# Patient Record
Sex: FEMALE | Race: WHITE | NOT HISPANIC OR LATINO | Employment: FULL TIME | URBAN - NONMETROPOLITAN AREA
[De-identification: names, ages, dates, MRNs, and addresses within clinical notes are randomized per-mention and may not be internally consistent; named-entity substitution may affect disease eponyms.]

---

## 2017-10-19 ENCOUNTER — OFFICE VISIT (OUTPATIENT)
Dept: FAMILY MEDICINE CLINIC | Facility: CLINIC | Age: 42
End: 2017-10-19

## 2017-10-19 VITALS
HEIGHT: 63 IN | WEIGHT: 185 LBS | SYSTOLIC BLOOD PRESSURE: 130 MMHG | DIASTOLIC BLOOD PRESSURE: 100 MMHG | BODY MASS INDEX: 32.78 KG/M2

## 2017-10-19 DIAGNOSIS — M79.2 NERVE PAIN: ICD-10-CM

## 2017-10-19 DIAGNOSIS — M25.422 SWELLING OF LEFT ELBOW: ICD-10-CM

## 2017-10-19 DIAGNOSIS — R20.0 NUMBNESS AND TINGLING OF RIGHT LEG: ICD-10-CM

## 2017-10-19 DIAGNOSIS — R32 URINARY INCONTINENCE, UNSPECIFIED TYPE: ICD-10-CM

## 2017-10-19 DIAGNOSIS — M79.604 LUMBAR PAIN WITH RADIATION DOWN RIGHT LEG: Primary | ICD-10-CM

## 2017-10-19 DIAGNOSIS — R20.2 NUMBNESS AND TINGLING OF RIGHT LEG: ICD-10-CM

## 2017-10-19 DIAGNOSIS — M54.50 LUMBAR PAIN WITH RADIATION DOWN RIGHT LEG: Primary | ICD-10-CM

## 2017-10-19 PROCEDURE — 99214 OFFICE O/P EST MOD 30 MIN: CPT | Performed by: NURSE PRACTITIONER

## 2017-10-19 RX ORDER — IBUPROFEN 800 MG/1
800 TABLET ORAL EVERY 6 HOURS PRN
Qty: 90 TABLET | Refills: 5 | OUTPATIENT
Start: 2017-10-19 | End: 2018-01-27

## 2017-10-19 RX ORDER — BACLOFEN 10 MG/1
10 TABLET ORAL 3 TIMES DAILY
Qty: 90 TABLET | Refills: 5 | OUTPATIENT
Start: 2017-10-19 | End: 2019-10-14

## 2017-10-19 NOTE — PROGRESS NOTES
Chief Complaint   Patient presents with   • Back Pain   • Arm Problem     left arm swollen area     Subjective   Karen Estrada is a 42 y.o. female.     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 The current episode started more than 1 month ago. The problem occurs constantly. The problem has been rapidly worsening since onset. The pain is present in the lumbar spine and sacro-iliac. The quality of the pain is described as burning, cramping, shooting and stabbing. The pain radiates to the right foot, right knee and right thigh. The pain is at a severity of 10/10. The pain is severe. The pain is the same all the time. The symptoms are aggravated by lying down, position, sitting, standing, twisting and bending. Stiffness is present all day. Associated symptoms include bladder incontinence, leg pain, numbness, paresis, paresthesias, pelvic pain, tingling and weakness. Pertinent negatives include no abdominal pain, bowel incontinence, chest pain, dysuria, fever, headaches, perianal numbness or weight loss. She has tried analgesics, walking, NSAIDs, heat, home exercises, chiropractic manipulation, ice, muscle relaxant and bed rest (symptoms are worsening with treatment ) for the symptoms. The treatment provided mild relief.   Arm Problem   This is a new (noticed swelling left elbow 2 months ago-now hurting around the area-) problem. The current episode started more than 1 month ago. The problem occurs daily. The problem has been gradually worsening. Associated symptoms include arthralgias, joint swelling, myalgias, neck pain, numbness and weakness.  Pertinent negatives include no abdominal pain, chest pain, chills, congestion, fever, headaches, rash, urinary symptoms, visual change or vomiting. Nothing aggravates the symptoms. She has tried rest, NSAIDs and ice for the symptoms. The treatment provided no relief.        The following portions of the patient's history were reviewed and updated as appropriate: allergies, current medications, past social history and problem list.    Review of Systems   Constitutional: Negative for activity change, appetite change, chills, fever and weight loss.   HENT: Negative.  Negative for congestion.    Eyes: Negative.    Respiratory: Negative.  Negative for apnea and chest tightness.    Cardiovascular: Negative.  Negative for chest pain, palpitations and leg swelling.   Gastrointestinal: Negative.  Negative for abdominal distention, abdominal pain, anal bleeding, bowel incontinence and vomiting.   Endocrine: Negative.  Negative for cold intolerance, heat intolerance, polydipsia, polyphagia and polyuria.   Genitourinary: Positive for bladder incontinence, difficulty urinating, enuresis, frequency and pelvic pain. Negative for decreased urine volume, dyspareunia, dysuria, flank pain, genital sores, hematuria, menstrual problem, urgency, vaginal bleeding, vaginal discharge and vaginal pain.   Musculoskeletal: Positive for arthralgias, back pain, gait problem, joint swelling, myalgias, neck pain and neck stiffness.   Skin: Negative.  Negative for color change and rash.   Allergic/Immunologic: Negative.  Negative for environmental allergies, food allergies and immunocompromised state.   Neurological: Positive for tingling, weakness, numbness and paresthesias. Negative for dizziness, tremors, seizures, syncope, facial asymmetry, speech difficulty, light-headedness and headaches.        Numbness right lateral leg-pain radiating with mid pelvic now having issues with bladder incontinence    Hematological: Negative.  Negative for  "adenopathy. Does not bruise/bleed easily.   Psychiatric/Behavioral: Negative.  Negative for agitation, behavioral problems and confusion.   All other systems reviewed and are negative.      Objective   /100  Ht 63\" (160 cm)  Wt 185 lb (83.9 kg)  BMI 32.77 kg/m2  Physical Exam   Constitutional: She is oriented to person, place, and time. She appears well-developed and well-nourished.   HENT:   Head: Normocephalic and atraumatic.   Eyes: EOM are normal. Pupils are equal, round, and reactive to light. Right eye exhibits no discharge. Left eye exhibits no discharge. No scleral icterus.   Neck: Normal range of motion. Neck supple. No JVD present. No tracheal deviation present. No thyromegaly present.   Cardiovascular: Normal rate, regular rhythm, normal heart sounds and normal pulses.  Exam reveals no gallop and no friction rub.    No murmur heard.  Pulmonary/Chest: Effort normal and breath sounds normal. No stridor. No respiratory distress. She has no wheezes. She has no rales. She exhibits no tenderness.   Abdominal: Soft. Bowel sounds are normal. She exhibits no distension and no mass. There is no tenderness. There is no rebound and no guarding. No hernia.   Genitourinary: Rectum normal and vagina normal. Pelvic exam was performed with patient supine. Uterus is not deviated, not enlarged, not fixed and not tender. Cervix exhibits no motion tenderness, no discharge and no friability. Right adnexum displays no mass. Left adnexum displays no mass.   Musculoskeletal: Normal range of motion. She exhibits tenderness. She exhibits no edema or deformity.        Left elbow: She exhibits swelling. Tenderness found.        Lumbar back: She exhibits tenderness, bony tenderness, pain and spasm. She exhibits no swelling, no edema, no deformity, no laceration and normal pulse.        Back:         Arms:  Lymphadenopathy:     She has no cervical adenopathy.   Neurological: She is alert and oriented to person, place, and " time. She has normal reflexes. She displays normal reflexes. No cranial nerve deficit. She exhibits normal muscle tone. Coordination normal.   Skin: Skin is warm and dry. No rash noted. No erythema. No pallor.   Psychiatric: She has a normal mood and affect.   Nursing note and vitals reviewed.    XR Spine Lumbar 2 or 3 View   Status:  Final result   Visible to patient:  No (Not Released) Dx:  Lumbar pain with radiation down right... Order: [id number]         Details        Reading Physician Reading Date Result Priority       Michael Ch MD 10/19/2017            Narrative               PROCEDURE: Lumbar spine 3 views    REASON FOR EXAM: lumbar pain, M54.5 Low back pain M79.2 Neuralgia  and neuritis, unspecified    FINDINGS: Comparison exam dated April 25, 2011. Lumbar spine  vertebral body heights and alignment are normal. There is no  evidence of fracture or dislocation.Moderate loss of L4-L5  intervertebral disc space height since prior studies suggest  moderate degenerative disc disease at this level. Otherwise  intervertebral disc spaces are intact.             Impression             1. Moderate L4-5 degenerative disc disease..  2. Otherwise no acute lumbar spine abnormality..    Electronically signed by:  Pranav Ch MD  10/19/2017 11:56 AM CDT  Workstation: [id number]              Assessment/Plan   Problem List Items Addressed This Visit        Nervous and Auditory    Lumbar pain with radiation down right leg - Primary    Relevant Orders    XR Spine Lumbar 2 or 3 View (Completed)    MRI Lumbar Spine Without Contrast    Nerve pain    Relevant Orders    XR Spine Lumbar 2 or 3 View (Completed)    Numbness and tingling of right leg       Other    Swelling of left elbow    Relevant Orders    XR elbow 3+ vw left (Completed)    Ambulatory Referral to Orthopedic Surgery    Urinary incontinence           New Medications Ordered This Visit   Medications   • ibuprofen (ADVIL,MOTRIN) 800 MG tablet     Sig: Take 1 tablet  by mouth Every 6 (Six) Hours As Needed for Moderate Pain .     Dispense:  90 tablet     Refill:  5   • baclofen (LIORESAL) 10 MG tablet     Sig: Take 1 tablet by mouth 3 (Three) Times a Day.     Dispense:  90 tablet     Refill:  5      hold home therapy for now -no chiropractor for now-meds as directed, ice to area, return if worsen -ibuprofen and baclofen as needed for pain until MRI

## 2017-10-30 ENCOUNTER — HOSPITAL ENCOUNTER (OUTPATIENT)
Dept: MRI IMAGING | Facility: HOSPITAL | Age: 42
Discharge: HOME | End: 2017-10-30
Admitting: NURSE PRACTITIONER

## 2017-10-30 DIAGNOSIS — M51.36 L4-L5 DISC BULGE: Primary | ICD-10-CM

## 2017-10-30 PROCEDURE — 72148 MRI LUMBAR SPINE W/O DYE: CPT

## 2017-11-20 ENCOUNTER — TELEPHONE (OUTPATIENT)
Dept: FAMILY MEDICINE CLINIC | Facility: CLINIC | Age: 42
End: 2017-11-20

## 2017-12-04 ENCOUNTER — TELEPHONE (OUTPATIENT)
Dept: FAMILY MEDICINE CLINIC | Facility: CLINIC | Age: 42
End: 2017-12-04

## 2017-12-04 RX ORDER — ESCITALOPRAM OXALATE 5 MG/1
5 TABLET ORAL DAILY
Qty: 30 TABLET | Refills: 11 | Status: SHIPPED | OUTPATIENT
Start: 2017-12-04 | End: 2018-06-11

## 2017-12-04 RX ORDER — LOSARTAN POTASSIUM AND HYDROCHLOROTHIAZIDE 12.5; 5 MG/1; MG/1
1 TABLET ORAL DAILY
Qty: 30 TABLET | Refills: 11 | Status: SHIPPED | OUTPATIENT
Start: 2017-12-04 | End: 2017-12-27 | Stop reason: SDUPTHER

## 2017-12-04 NOTE — TELEPHONE ENCOUNTER
PATIENT NEEDS REFILL ON HER escitalopram (LEXAPRO) 5 MG tablet AND HER losartan-hydrochlorothiazide (HYZAAR) 50-12.5 MG per tablet. [facility]

## 2017-12-11 RX ORDER — ESCITALOPRAM OXALATE 5 MG/1
TABLET ORAL
Qty: 30 TABLET | Refills: 5 | Status: SHIPPED | OUTPATIENT
Start: 2017-12-11 | End: 2018-06-11 | Stop reason: SDUPTHER

## 2017-12-27 RX ORDER — LOSARTAN POTASSIUM AND HYDROCHLOROTHIAZIDE 12.5; 5 MG/1; MG/1
1 TABLET ORAL DAILY
Qty: 30 TABLET | Refills: 11 | Status: SHIPPED | OUTPATIENT
Start: 2017-12-27 | End: 2018-11-12 | Stop reason: SDUPTHER

## 2018-01-18 ENCOUNTER — HOSPITAL ENCOUNTER (EMERGENCY)
Facility: HOSPITAL | Age: 43
Discharge: HOME | End: 2018-01-18
Attending: EMERGENCY MEDICINE | Admitting: EMERGENCY MEDICINE

## 2018-01-18 ENCOUNTER — APPOINTMENT (OUTPATIENT)
Dept: CT IMAGING | Facility: HOSPITAL | Age: 43
End: 2018-01-18

## 2018-01-18 ENCOUNTER — APPOINTMENT (OUTPATIENT)
Dept: GENERAL RADIOLOGY | Facility: HOSPITAL | Age: 43
End: 2018-01-18

## 2018-01-18 VITALS
SYSTOLIC BLOOD PRESSURE: 135 MMHG | TEMPERATURE: 97.8 F | WEIGHT: 180 LBS | HEIGHT: 63 IN | HEART RATE: 62 BPM | DIASTOLIC BLOOD PRESSURE: 81 MMHG | RESPIRATION RATE: 18 BRPM | BODY MASS INDEX: 31.89 KG/M2 | OXYGEN SATURATION: 98 %

## 2018-01-18 DIAGNOSIS — J01.20 ACUTE ETHMOIDAL SINUSITIS, RECURRENCE NOT SPECIFIED: ICD-10-CM
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
## 2018-01-18 DIAGNOSIS — S83.412A SPRAIN OF MEDIAL COLLATERAL LIGAMENT OF LEFT KNEE, INITIAL ENCOUNTER: Primary | ICD-10-CM

## 2018-01-18 LAB
ALBUMIN SERPL-MCNC: 4.4 G/DL (ref 3.4–4.8)
ALBUMIN/GLOB SERPL: 1.4 G/DL (ref 1.1–1.8)
ALP SERPL-CCNC: 67 U/L (ref 38–126)
ALT SERPL W P-5'-P-CCNC: 89 U/L (ref 9–52)
ANION GAP SERPL CALCULATED.3IONS-SCNC: 12 MMOL/L (ref 5–15)
AST SERPL-CCNC: 68 U/L (ref 14–36)
BASOPHILS # BLD AUTO: 0.02 10*3/MM3 (ref 0–0.2)
BASOPHILS NFR BLD AUTO: 0.3 % (ref 0–2)
BILIRUB SERPL-MCNC: 0.3 MG/DL (ref 0.2–1.3)
BILIRUB UR QL STRIP: NEGATIVE
BUN BLD-MCNC: 13 MG/DL (ref 7–21)
BUN/CREAT SERPL: 21.7 (ref 7–25)
CALCIUM SPEC-SCNC: 9.5 MG/DL (ref 8.4–10.2)
CHLORIDE SERPL-SCNC: 102 MMOL/L (ref 95–110)
CLARITY UR: CLEAR
CO2 SERPL-SCNC: 27 MMOL/L (ref 22–31)
COLOR UR: YELLOW
CREAT BLD-MCNC: 0.6 MG/DL (ref 0.5–1)
DEPRECATED RDW RBC AUTO: 41.4 FL (ref 36.4–46.3)
EOSINOPHIL # BLD AUTO: 0.14 10*3/MM3 (ref 0–0.7)
EOSINOPHIL NFR BLD AUTO: 1.9 % (ref 0–7)
ERYTHROCYTE [DISTWIDTH] IN BLOOD BY AUTOMATED COUNT: 13.2 % (ref 11.5–14.5)
GFR SERPL CREATININE-BSD FRML MDRD: 109 ML/MIN/1.73 (ref 60–135)
GLOBULIN UR ELPH-MCNC: 3.1 GM/DL (ref 2.3–3.5)
GLUCOSE BLD-MCNC: 99 MG/DL (ref 60–100)
GLUCOSE UR STRIP-MCNC: NEGATIVE MG/DL
HCG SERPL QL: NEGATIVE
HCT VFR BLD AUTO: 37.1 % (ref 35–45)
HGB BLD-MCNC: 12.8 G/DL (ref 12–15.5)
HGB UR QL STRIP.AUTO: NEGATIVE
IMM GRANULOCYTES # BLD: 0.03 10*3/MM3 (ref 0–0.02)
IMM GRANULOCYTES NFR BLD: 0.4 % (ref 0–0.5)
KETONES UR QL STRIP: NEGATIVE
LEUKOCYTE ESTERASE UR QL STRIP.AUTO: NEGATIVE
LIPASE SERPL-CCNC: 76 U/L (ref 23–300)
LYMPHOCYTES # BLD AUTO: 1.83 10*3/MM3 (ref 0.6–4.2)
LYMPHOCYTES NFR BLD AUTO: 25.5 % (ref 10–50)
MCH RBC QN AUTO: 29.3 PG (ref 26.5–34)
MCHC RBC AUTO-ENTMCNC: 34.5 G/DL (ref 31.4–36)
MCV RBC AUTO: 84.9 FL (ref 80–98)
MONOCYTES # BLD AUTO: 0.77 10*3/MM3 (ref 0–0.9)
MONOCYTES NFR BLD AUTO: 10.7 % (ref 0–12)
NEUTROPHILS # BLD AUTO: 4.4 10*3/MM3 (ref 2–8.6)
NEUTROPHILS NFR BLD AUTO: 61.2 % (ref 37–80)
NITRITE UR QL STRIP: NEGATIVE
PH UR STRIP.AUTO: 7.5 [PH] (ref 5–9)
PLATELET # BLD AUTO: 312 10*3/MM3 (ref 150–450)
PMV BLD AUTO: 9.2 FL (ref 8–12)
POTASSIUM BLD-SCNC: 3.5 MMOL/L (ref 3.5–5.1)
PROT SERPL-MCNC: 7.5 G/DL (ref 6.3–8.6)
PROT UR QL STRIP: NEGATIVE
RBC # BLD AUTO: 4.37 10*6/MM3 (ref 3.77–5.16)
SODIUM BLD-SCNC: 141 MMOL/L (ref 137–145)
SP GR UR STRIP: 1.01 (ref 1–1.03)
UROBILINOGEN UR QL STRIP: NORMAL
WBC NRBC COR # BLD: 7.19 10*3/MM3 (ref 3.2–9.8)

## 2018-01-18 PROCEDURE — 70450 CT HEAD/BRAIN W/O DYE: CPT

## 2018-01-18 PROCEDURE — 99284 EMERGENCY DEPT VISIT MOD MDM: CPT

## 2018-01-18 PROCEDURE — 73630 X-RAY EXAM OF FOOT: CPT

## 2018-01-18 PROCEDURE — 84703 CHORIONIC GONADOTROPIN ASSAY: CPT | Performed by: EMERGENCY MEDICINE

## 2018-01-18 PROCEDURE — 85025 COMPLETE CBC W/AUTO DIFF WBC: CPT | Performed by: EMERGENCY MEDICINE

## 2018-01-18 PROCEDURE — 71045 X-RAY EXAM CHEST 1 VIEW: CPT

## 2018-01-18 PROCEDURE — 80053 COMPREHEN METABOLIC PANEL: CPT | Performed by: EMERGENCY MEDICINE

## 2018-01-18 PROCEDURE — 0 IOPAMIDOL 61 % SOLUTION: Performed by: EMERGENCY MEDICINE

## 2018-01-18 PROCEDURE — 81003 URINALYSIS AUTO W/O SCOPE: CPT | Performed by: EMERGENCY MEDICINE

## 2018-01-18 PROCEDURE — 73564 X-RAY EXAM KNEE 4 OR MORE: CPT

## 2018-01-18 PROCEDURE — 74177 CT ABD & PELVIS W/CONTRAST: CPT

## 2018-01-18 PROCEDURE — 83690 ASSAY OF LIPASE: CPT | Performed by: EMERGENCY MEDICINE

## 2018-01-18 RX ORDER — AMOXICILLIN AND CLAVULANATE POTASSIUM 875; 125 MG/1; MG/1
1 TABLET, FILM COATED ORAL 2 TIMES DAILY
Qty: 20 TABLET | Refills: 0 | Status: SHIPPED | OUTPATIENT
Start: 2018-01-18 | End: 2018-01-28

## 2018-01-18 RX ORDER — CETIRIZINE HYDROCHLORIDE, PSEUDOEPHEDRINE HYDROCHLORIDE 5; 120 MG/1; MG/1
1 TABLET, FILM COATED, EXTENDED RELEASE ORAL 2 TIMES DAILY PRN
Qty: 14 TABLET | Refills: 0 | OUTPATIENT
Start: 2018-01-18 | End: 2019-08-11

## 2018-01-18 RX ORDER — SODIUM CHLORIDE 0.9 % (FLUSH) 0.9 %
10 SYRINGE (ML) INJECTION AS NEEDED
Status: DISCONTINUED | OUTPATIENT
Start: 2018-01-18 | End: 2018-01-18 | Stop reason: HOSPADM

## 2018-01-18 RX ADMIN — IOPAMIDOL 90 ML: 612 INJECTION, SOLUTION INTRAVENOUS at 10:04

## 2018-01-18 NOTE — ED PROVIDER NOTES
Subjective   HPI Comments: 41yo female pmh significant htn/anxiety presents ED via EMS s/p trip et fall down stairs c/o headache/left foot pain/left knee pain.  Pt daughter reports patient called for help and subsequently found patient sitting at bottom of stairs with rapid breathing/shaking of arms et legs with eyes open staring at floor and subsequent unresponsive.  Per RN notes, EMS reported similar behavior which resolved with verbal reassurance.  Pt denies neck pain/back pain/chest pain/soa/abd pain/hx seizure.  No reported hx postictal confusion or tongue biting noted.    Patient is a 42 y.o. female presenting with fall.   Fall   Mechanism of injury: fall    Injury location:  Leg  Leg injury location:  L foot and L knee  Incident location:  Home  Arrived directly from scene: yes    Fall:     Fall occurred:  Down stairs    Entrapped after fall: no    Suspicion of alcohol use: no    Suspicion of drug use: no    Associated symptoms: headaches    Associated symptoms: no back pain and no neck pain        Review of Systems   Constitutional: Negative.    Eyes: Negative.    Respiratory: Negative.    Cardiovascular: Negative.    Gastrointestinal: Negative.    Genitourinary: Negative.    Musculoskeletal: Positive for arthralgias. Negative for back pain and neck pain.   Neurological: Positive for headaches.       Past Medical History:   Diagnosis Date   • Backache     radiation to legs-abnormal MRI   • Benign hypertension    • Breast lump     left breast   • Chest pain    • Cough    • Degenerative joint disease involving multiple joints    • Epigastric pain    • Essential hypertension    • Fatigue    • Functional heart murmur    • Gestational diabetes mellitus    • Hypertensive disorder    • Intrinsic asthma with status asthmaticus    • Low back pain    • Lumbar disc lesion    • Lymphadenopathy    • Malaise and fatigue    • Migraine    • Neck pain     throat pain   • Non-IgE mediated allergic asthma    • Noncompliance  with treatment    • Overweight    • Plantar fascial fibromatosis    • Plantar fasciitis of right foot    • Soft tissue swelling     right lateral chest wall   • Surgery follow-up    • Upper respiratory infection    • Wheezing        Allergies   Allergen Reactions   • Promethazine Hallucinations   • Adhesive Tape Swelling and Rash       Past Surgical History:   Procedure Laterality Date   • BREAST BIOPSY  1998    left mass, fibrosis, mild   •  SECTION     • COLONOSCOPY  2005    normal colonoscopic exam.  Irritable bowel   • DILATATION AND CURETTAGE  1998    incomplete    • FOOT SURGERY  2015    partial plantar fasciectomy, right foot   • HYSTERECTOMY  2007    menorrhagia   • INJECTION OF MEDICATION  2014    dexamethasone (1)   • INJECTION OF MEDICATION  2014    inj Tend-Sheath Ligament  (3)   • INJECTION OF MEDICATION  2014    Kenalog (4)   • INJECTION OF MEDICATION  2011    Toradol (1)   • PAP SMEAR  10/15/1996    normal   • TUBAL ABDOMINAL LIGATION     • VAGINAL DELIVERY      x3       History reviewed. No pertinent family history.    Social History     Social History   • Marital status:      Spouse name: N/A   • Number of children: N/A   • Years of education: N/A     Social History Main Topics   • Smoking status: Never Smoker   • Smokeless tobacco: Never Used   • Alcohol use No   • Drug use: No   • Sexual activity: Not Asked     Other Topics Concern   • None     Social History Narrative   • None           Objective   Physical Exam   Constitutional: She is oriented to person, place, and time. She appears well-developed and well-nourished.   HENT:   Head: Normocephalic and atraumatic. Head is without raccoon's eyes, without Rose's sign, without abrasion and without contusion.   Right Ear: External ear normal.   Left Ear: External ear normal.   Nose: Nose normal.   Mouth/Throat: Oropharynx is clear and moist.   Eyes: EOM are normal.  Pupils are equal, round, and reactive to light.   Neck: Normal range of motion. Neck supple. No JVD present. No tracheal deviation present.   Neg nexus   Cardiovascular: Normal rate, regular rhythm, normal heart sounds and intact distal pulses.  Exam reveals no gallop and no friction rub.    No murmur heard.  Pulmonary/Chest: Effort normal and breath sounds normal. She has no wheezes. She has no rales.   Abdominal: Soft. Bowel sounds are normal. She exhibits no distension and no mass. There is no tenderness. There is no rebound and no guarding.   Musculoskeletal: Normal range of motion.        Left knee: She exhibits normal range of motion, no swelling, no effusion, no ecchymosis, no deformity, no laceration, no erythema, normal alignment, no LCL laxity, no bony tenderness, normal meniscus and no MCL laxity. Tenderness found. MCL tenderness noted.        Left foot: Normal.   nontender c/t/l spine. Neg stepoff/deformity   Lymphadenopathy:     She has no cervical adenopathy.   Neurological: She is alert and oriented to person, place, and time. She has normal strength. No cranial nerve deficit or sensory deficit. Coordination normal. GCS eye subscore is 4. GCS verbal subscore is 5. GCS motor subscore is 6.   Skin: Skin is warm and dry.   Nursing note and vitals reviewed.      Procedures         ED Course  ED Course      Labs Reviewed   COMPREHENSIVE METABOLIC PANEL - Abnormal; Notable for the following:        Result Value    ALT (SGPT) 89 (*)     AST (SGOT) 68 (*)     All other components within normal limits   CBC WITH AUTO DIFFERENTIAL - Abnormal; Notable for the following:     Immature Grans, Absolute 0.03 (*)     All other components within normal limits   LIPASE - Normal   URINALYSIS W/ CULTURE IF INDICATED - Normal    Narrative:     Urine microscopic not indicated.   HCG, SERUM, QUALITATIVE - Normal   CBC AND DIFFERENTIAL    Narrative:     The following orders were created for panel order CBC &  Differential.  Procedure                               Abnormality         Status                     ---------                               -----------         ------                     CBC Auto Differential[[id number]]        Abnormal            Final result                 Please view results for these tests on the individual orders.     Xr Knee 4+ View Left    Result Date: 1/18/2018  Narrative: Four view left knee HISTORY: Pain after falling AP, oblique, tunnel and lateral views obtained. COMPARISON: None No fracture or dislocation. No suprapatellar effusion. No other osseous or articular abnormality.     Impression: CONCLUSION: No fracture or dislocation 30027 Electronically signed by:  Theodore Carmichael MD  1/18/2018 9:20 AM CST Workstation: [email or web address]    Xr Foot 3+ View Left    Result Date: 1/18/2018  Narrative: PROCEDURE: Three views left foot COMPARISON: Left foot MRI dated 3/23/2010 HISTORY:Injury, pain FINDINGS: No acute fracture, subluxation or focal osseous lesion. There is a stable lucency in the base of the first proximal phalanx correlating to a bone cyst seen on prior MRI. There is a stable sclerotic bone island in the head of the fourth metatarsal. No aggressive osseous lesion is seen. There is a far calcaneal spur.     Impression: CONCLUSION:  No radiographic evidence of acute fracture Electronically signed by:  Bucky Jane MD  1/18/2018 9:16 AM CST Workstation: [id number]    Ct Head Without Contrast    Result Date: 1/18/2018  Narrative: [facility] [place] Patient Name: MRS. ILENE CLARK ORDERING: TATYANA LEROY ATTENDING: TATYANA LEROY REFERRING: TATYANA LEROY ----------------------- EXAMINATION:  CT SCAN OF THE HEAD WITHOUT INTRAVENOUS CONTRAST CLINICAL INFORMATION:  Fall/headache DOSE LENGTH PRODUCT: 892.5 This exam was performed using radiation doses that are as low as reasonably achievable (ALARA). This exam was performed according to our departmental dose optimization  program, which includes automated exposure control, adjustment of the mA and/or KV according to patient size and/or use of iterative reconstruction technique. COMPARISON: None available. TECHNIQUE:  Axial images from skull base to vertex.  FINDINGS: There is no evidence of intracranial hemorrhage, parenchymal mass, midline shift, or focal mass effect. There is no hydrocephalus or effacement of the basilar cisterns. There is no extra-axial hemorrhage or collection identified. There is partial opacification of the sphenoid sinuses and ethmoid air cells. The mastoid air cells and visualized paranasal sinuses appear otherwise clear.     Impression: No evidence of intracranial hemorrhage, mass effect or large acute infarct. Sphenoid and ethmoid sinus disease. Electronically signed by:  Bucky Jane MD  1/18/2018 8:49 AM CST Workstation: [id number]    Ct Abdomen Pelvis With Contrast    Result Date: 1/18/2018  Narrative: CT ABDOMEN AND PELVIS WITH CONTRAST HISTORY: Fell. Elevated liver function test. Following the intravenous administration of contrast, axial spiral scans of the abdomen and pelvis were obtained.  Coronal and sagital reconstructions were preformed. This exam was performed according to our departmental dose-optimization program, which includes automated exposure control, adjustment of the mA and/or kV according to patient size and/or use of iterative reconstruction technique. DLP: 408.80 COMPARISON: None Scans of the lung bases demonstrate minimal dependent atelectasis. No acute osseous abnormality. Degenerative changes lower thoracic spine and degenerative disc disease and spondylotic change L4-5. Fatty infiltration of the liver. The gallbladder is unremarkable. The spleen is unremarkable. The pancreas is unremarkable. The adrenal glands are unremarkable. No renal or ureteral calculi. No renal mass. No hydronephrosis. Unremarkable bladder. No pelvic mass. Hysterectomy. No abdominal aortic aneurysm. No  adenopathy. No bowel obstruction. Normal appendix. No free air. No free fluid. Small fat-containing umbilical hernia.     Impression: CONCLUSION: No intra-abdominal or pelvic injury identified. Fatty infiltration of the liver. Small fat-containing umbilical hernia. Hysterectomy. Degenerative disc disease and spondylotic change L4-5. 80200 Electronically signed by:  Theodore Carmichael MD  1/18/2018 10:28 AM CST Workstation: [email or web address] Chest 1 View    Result Date: 1/18/2018  Narrative: [facility] Patient Name: MRS. ILENE CLARK ORDERING: SHADY JIANG ATTENDING: SHADY JIANG REFERRING: SHADY JIANG ----------------------- PROCEDURE: Portable chest x-ray TECHNIQUE: Single AP view of the chest COMPARISON: None HISTORY: Injury, pain FINDINGS:  Life-support devices: None Lungs/pleura: Clear Heart, hilar and mediastinal structures:  Normal accounting for projection and technique     Impression: CONCLUSION: No acute pulmonary disease. Electronically signed by:  Bucky Jane MD  1/18/2018 9:09 AM CST Workstation: [id number]                [facility]    Final diagnoses:   Sprain of medial collateral ligament of left knee, initial encounter   Acute ethmoidal sinusitis, recurrence not specified   Fall, initial encounter            Shady Jiang MD  01/18/18 0919       Shady Jiang MD  01/18/18 1040

## 2018-01-18 NOTE — ED TRIAGE NOTES
Patient presents to the ED post fall this morning via Ambulance. Patient states she tripped over a cat and fell down some stairs. Patient is unsure how many stairs she fell down. Patient is complaining of back pain and left knee/lower leg pain. Per EMS report from [facility], when ambulance arrived patient's daughter stated that the patient became nauseated and then seemed like she was having seizure like activity (her muscles tensed up and she was not talking). [facility]admini with EMS was able to  patient to calm down. When patient was loaded up into ambulance she again began to tense her muscles as though attempting to portray seizure like activity per EMS report. Padmini with EMS told patient in order to fully assess her she needed to relax and let Padmini do her job. Patient then relaxed and was brought to the ED.

## 2018-01-22 ENCOUNTER — OFFICE VISIT (OUTPATIENT)
Dept: ORTHOPEDIC SURGERY | Facility: CLINIC | Age: 43
End: 2018-01-22

## 2018-01-22 VITALS — WEIGHT: 180 LBS | BODY MASS INDEX: 31.89 KG/M2 | HEIGHT: 63 IN
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
## 2018-01-22 DIAGNOSIS — M25.562 ACUTE PAIN OF LEFT KNEE: ICD-10-CM

## 2018-01-22 DIAGNOSIS — S89.92XA LEFT KNEE INJURY, INITIAL ENCOUNTER: Primary | ICD-10-CM

## 2018-01-22 DIAGNOSIS — M23.92 INTERNAL DERANGEMENT OF LEFT KNEE: ICD-10-CM

## 2018-01-22 PROCEDURE — 99214 OFFICE O/P EST MOD 30 MIN: CPT | Performed by: NURSE PRACTITIONER

## 2018-01-22 NOTE — PROGRESS NOTES
Karen Estrada is a 42 y.o. female   Primary provider:  MERARI Esquivel       Chief Complaint   Patient presents with   • Left Knee - Pain     X-rays done @ [facility] 1/18/18    HISTORY OF PRESENT ILLNESS: Patient presents with left knee injury, seen in ED on 1/18/18. Patient states she fell down the stairs at home. Pain scale today 6/10 when standing.     HPI Comments: Patient complains of left knee pain/injury after falling down a flight of stairs at her home on 1/18/2018.  Patient was seen in the ED on that date with x-rays done and placed in a knee immobilizer and given crutches for ambulation.  Patient was prescribed Lodine for pain.  Pain is described as constant and moderate in severity.  Pain is described as aching in nature with associated popping sensations, bruising and swelling.  Pain is worse with weightbearing, standing, walking, sitting and driving.  Patient has increased pain with rotation and pivoting of her left knee.  Pain improves mildly with ice therapy, anti-inflammatory medication, rest and immobilization.    Pain   This is a new problem. The current episode started in the past 7 days (1/18/2018). The problem occurs constantly. Associated symptoms include joint swelling. Associated symptoms comments: Left knee pain . The symptoms are aggravated by standing and walking (sitting and driving). She has tried rest, ice and NSAIDs (crutches, knee immobilizer) for the symptoms. The treatment provided mild relief.        CONCURRENT MEDICAL HISTORY:    Past Medical History:   Diagnosis Date   • Backache     radiation to legs-abnormal MRI   • Benign hypertension    • Breast lump     left breast   • Chest pain    • Cough    • Degenerative joint disease involving multiple joints    • Epigastric pain    • Essential hypertension    • Fatigue    • Functional heart murmur    • Gestational diabetes mellitus    • Hypertensive disorder    • Intrinsic asthma with status asthmaticus    • Low back pain    •  Lumbar disc lesion    • Lymphadenopathy    • Malaise and fatigue    • Migraine    • Neck pain     throat pain   • Non-IgE mediated allergic asthma    • Noncompliance with treatment    • Overweight    • Plantar fascial fibromatosis    • Plantar fasciitis of right foot    • Soft tissue swelling     right lateral chest wall   • Surgery follow-up    • Upper respiratory infection    • Wheezing        Allergies   Allergen Reactions   • Promethazine Hallucinations   • Adhesive Tape Swelling and Rash         Current Outpatient Prescriptions:   •  amoxicillin-clavulanate (AUGMENTIN) 875-125 MG per tablet, Take 1 tablet by mouth 2 (Two) Times a Day for 10 days., Disp: 20 tablet, Rfl: 0  •  baclofen (LIORESAL) 10 MG tablet, Take 1 tablet by mouth 3 (Three) Times a Day., Disp: 90 tablet, Rfl: 5  •  cetirizine-pseudoephedrine (ZyrTEC-D) 5-120 MG per 12 hr tablet, Take 1 tablet by mouth 2 (Two) Times a Day As Needed for Rhinitis., Disp: 14 tablet, Rfl: 0  •  escitalopram (LEXAPRO) 5 MG tablet, Take 1 tablet by mouth Daily., Disp: 30 tablet, Rfl: 11  •  escitalopram (LEXAPRO) 5 MG tablet, TAKE 1 TABLET BY MOUTH DAILY, Disp: 30 tablet, Rfl: 5  •  etodolac (LODINE) 300 MG capsule, Take 1 capsule by mouth 3 (Three) Times a Day As Needed (pain)., Disp: 30 capsule, Rfl: 0  •  ibuprofen (ADVIL,MOTRIN) 800 MG tablet, Take 1 tablet by mouth Every 6 (Six) Hours As Needed for Moderate Pain ., Disp: 90 tablet, Rfl: 5  •  losartan-hydrochlorothiazide (HYZAAR) 50-12.5 MG per tablet, Take 1 tablet by mouth Daily., Disp: 30 tablet, Rfl: 11    Past Surgical History:   Procedure Laterality Date   • BREAST BIOPSY  1998    left mass, fibrosis, mild   •  SECTION     • COLONOSCOPY  2005    normal colonoscopic exam.  Irritable bowel   • DILATATION AND CURETTAGE  1998    incomplete    • FOOT SURGERY  2015    partial plantar fasciectomy, right foot   • HYSTERECTOMY  2007    menorrhagia   • INJECTION OF  "MEDICATION  11/26/2014    dexamethasone (1)   • INJECTION OF MEDICATION  11/26/2014    inj Tend-Sheath Ligament  (3)   • INJECTION OF MEDICATION  11/26/2014    Kenalog (4)   • INJECTION OF MEDICATION  11/16/2011    Toradol (1)   • PAP SMEAR  10/15/1996    normal   • TUBAL ABDOMINAL LIGATION     • VAGINAL DELIVERY      x3       History reviewed. No pertinent family history.    Social History     Social History   • Marital status:      Spouse name: N/A   • Number of children: N/A   • Years of education: N/A     Occupational History   • Not on file.     Social History Main Topics   • Smoking status: Never Smoker   • Smokeless tobacco: Never Used   • Alcohol use No   • Drug use: No   • Sexual activity: Not on file     Other Topics Concern   • Not on file     Social History Narrative        Review of Systems   Constitutional: Negative.    HENT: Negative.    Eyes: Negative.    Respiratory: Negative.    Cardiovascular: Negative.    Gastrointestinal: Negative.    Endocrine: Negative.    Genitourinary: Negative.    Musculoskeletal: Positive for gait problem and joint swelling.        Left knee pain     Skin: Negative.    Allergic/Immunologic: Negative.    Hematological: Negative.    Psychiatric/Behavioral: Positive for sleep disturbance.       PHYSICAL EXAMINATION:       Ht 160 cm (63\")  Wt 81.6 kg (180 lb)  BMI 31.89 kg/m2    Physical Exam   Constitutional: She is oriented to person, place, and time. Vital signs are normal. She appears well-developed and well-nourished.   HENT:   Head: Normocephalic.   Pulmonary/Chest: Effort normal. No respiratory distress.   Abdominal: Soft. She exhibits no distension.   Musculoskeletal:        Right knee: She exhibits no effusion.        Left knee: She exhibits no effusion.   Neurological: She is alert and oriented to person, place, and time. GCS eye subscore is 4. GCS verbal subscore is 5. GCS motor subscore is 6.   Skin: Skin is warm, dry and intact.   Psychiatric: She has a " normal mood and affect. Her speech is normal and behavior is normal. Judgment and thought content normal. Cognition and memory are normal.   Vitals reviewed.      GAIT:     []  Normal  [x]  Antalgic    Assistive device: [x]  None  []  Walker     []  Crutches  []  Cane     []  Wheelchair  []  Stretcher    Right Knee Exam     Tenderness   The patient is experiencing no tenderness.         Range of Motion   The patient has normal right knee ROM.    Muscle Strength     The patient has normal right knee strength.    Tests   Tapan:  Medial - negative Lateral - negative  Varus: negative  Valgus: negative    Other   Erythema: absent  Sensation: normal  Pulse: present  Swelling: none  Other tests: no effusion present      Left Knee Exam     Tenderness   The patient is experiencing tenderness in the medial joint line.    Range of Motion   Extension: 0   Flexion: 100     Muscle Strength     The patient has normal left knee strength.    Tests   Tapan:  Medial - positive Lateral - positive  Varus: negative  Valgus: negative    Other   Erythema: absent  Sensation: normal  Pulse: present  Swelling: mild  Effusion: no effusion present    Comments:  Mild, generalized swelling of the knee and lower leg noted. Skin is intact. No deformity. No ecchymosis.             Xr Knee 4+ View Left    Result Date: 1/18/2018  Narrative: Four view left knee HISTORY: Pain after falling AP, oblique, tunnel and lateral views obtained. COMPARISON: None No fracture or dislocation. No suprapatellar effusion. No other osseous or articular abnormality.     Impression: CONCLUSION: No fracture or dislocation [id number] Electronically signed by:  Theodore Carmichael MD  1/18/2018 9:20 AM CST Workstation: [id number]    Xr Foot 3+ View Left    Result Date: 1/18/2018  Narrative: PROCEDURE: Three views left foot COMPARISON: Left foot MRI dated 3/23/2010 HISTORY:Injury, pain FINDINGS: No acute fracture, subluxation or focal osseous lesion. There is a stable lucency  in the base of the first proximal phalanx correlating to a bone cyst seen on prior MRI. There is a stable sclerotic bone island in the head of the fourth metatarsal. No aggressive osseous lesion is seen. There is a far calcaneal spur.     Impression: CONCLUSION:  No radiographic evidence of acute fracture Electronically signed by:  Bucky Jane MD  1/18/2018 9:16 AM CST Workstation: [id number]    CT Head Without Contrast    Result Date: 1/18/2018  Narrative: [facility] [place] Patient Name: MRS. ILENE CLARK ORDERING: TATYANA LEROY ATTENDING: TATYANA LEROY REFERRING: TATYANA LEROY EXAMINATION:  CT SCAN OF THE HEAD WITHOUT INTRAVENOUS CONTRAST CLINICAL INFORMATION:  Fall/headache DOSE LENGTH PRODUCT: 892.5 This exam was performed using radiation doses that are as low as reasonably achievable (ALARA). This exam was performed according to our departmental dose optimization program, which includes automated exposure control, adjustment of the mA and/or KV according to patient size and/or use of iterative reconstruction technique. COMPARISON: None available. TECHNIQUE:  Axial images from skull base to vertex.  FINDINGS: There is no evidence of intracranial hemorrhage, parenchymal mass, midline shift, or focal mass effect. There is no hydrocephalus or effacement of the basilar cisterns. There is no extra-axial hemorrhage or collection identified. There is partial opacification of the sphenoid sinuses and ethmoid air cells. The mastoid air cells and visualized paranasal sinuses appear otherwise clear.     Impression: No evidence of intracranial hemorrhage, mass effect or large acute infarct. Sphenoid and ethmoid sinus disease. Electronically signed by:  Bucky Jane MD  1/18/2018 8:49 AM CST Workstation: [id number]        ASSESSMENT:    Diagnoses and all orders for this visit:    Left knee injury, initial encounter  -     Miscellaneous DME  -     MRI Knee Left Without Contrast; Future    Fall down stairs,  initial encounter  -     Miscellaneous DME  -     MRI Knee Left Without Contrast; Future    Acute pain of left knee  -     Miscellaneous DME  -     MRI Knee Left Without Contrast; Future    Internal derangement of left knee  -     MRI Knee Left Without Contrast; Future      PLAN    X-rays of left knee done in the ED on 1/18/2018 are reviewed today.  Patient since sustained a significant twisting-type injury when she fell down a flight of stairs.  Patient continues to have pain in her left knee with associated swelling and increased pain with pivoting and rotation.  Symptoms are suspicious for meniscal injury.  Recommend MRI of left knee to evaluate for ligament and/or meniscus injury.  Recommend hinged knee brace for support/stability.  Recommend to continue with modified weight-bearing with use of her crutches.  Discussed with patient that she may gradually progress her weightbearing as her pain improves, based on her pain and as tolerated.  Recommend to continue with intermittent elevation of the left knee to minimize swelling.  Recommend to continue with ice therapy intermittently 3-4 times daily for 20 minutes at a time.  Recommend to continue Lodine for pain, as previously prescribed.  Follow-up after MRI.    Return after MRI.        This document has been electronically signed by MERARI Ulloa on January 22, 2018 12:23 PM    MERARI Ulloa

## 2018-01-26 ENCOUNTER — HOSPITAL ENCOUNTER (OUTPATIENT)
Dept: MRI IMAGING | Facility: HOSPITAL | Age: 43
Discharge: HOME | End: 2018-01-26
Admitting: NURSE PRACTITIONER

## 2018-01-26 DIAGNOSIS — S89.92XA LEFT KNEE INJURY, INITIAL ENCOUNTER: ICD-10-CM

## 2018-01-26 DIAGNOSIS — M23.92 INTERNAL DERANGEMENT OF LEFT KNEE: ICD-10-CM
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
## 2018-01-26 DIAGNOSIS — M25.562 ACUTE PAIN OF LEFT KNEE: ICD-10-CM

## 2018-01-26 PROCEDURE — 73721 MRI JNT OF LWR EXTRE W/O DYE: CPT

## 2018-01-30 ENCOUNTER — OFFICE VISIT (OUTPATIENT)
Dept: ORTHOPEDIC SURGERY | Facility: CLINIC | Age: 43
End: 2018-01-30

## 2018-01-30 VITALS — HEIGHT: 63 IN | WEIGHT: 192 LBS | BODY MASS INDEX: 34.02 KG/M2

## 2018-01-30 DIAGNOSIS — S83.412A SPRAIN OF MEDIAL COLLATERAL LIGAMENT OF LEFT KNEE, INITIAL ENCOUNTER: ICD-10-CM

## 2018-01-30 DIAGNOSIS — M25.562 ACUTE PAIN OF LEFT KNEE: Primary | ICD-10-CM

## 2018-01-30 DIAGNOSIS — M23.92 INTERNAL DERANGEMENT OF LEFT KNEE: ICD-10-CM

## 2018-01-30 DIAGNOSIS — M25.462 KNEE EFFUSION, LEFT: ICD-10-CM

## 2018-01-30 PROCEDURE — 20610 DRAIN/INJ JOINT/BURSA W/O US: CPT | Performed by: NURSE PRACTITIONER

## 2018-01-30 PROCEDURE — 99214 OFFICE O/P EST MOD 30 MIN: CPT | Performed by: NURSE PRACTITIONER

## 2018-01-30 RX ADMIN — TRIAMCINOLONE ACETONIDE 40 MG: 40 INJECTION, SUSPENSION INTRA-ARTICULAR; INTRAMUSCULAR at 10:05

## 2018-01-30 RX ADMIN — LIDOCAINE HYDROCHLORIDE 2 ML: 10 INJECTION, SOLUTION INFILTRATION; PERINEURAL at 10:05

## 2018-01-30 NOTE — PROGRESS NOTES
Karen Estrada is a 42 y.o. female returns for     Chief Complaint   Patient presents with   • Left Knee - Follow-up   • Results     mri left knee       HISTORY OF PRESENT ILLNESS: Patient presents to office for follow-up of left knee pain/injury.  Initial injury occurred on 1/18/2018 when she fell down the stairs at her home.  Left knee pain and swelling have been gradually improving since the time of the injury.  Patient has been wearing a hinged knee brace to the left knee for support.  Patient is no longer requiring crutches for ambulation and has been able to progress her weightbearing.    CONCURRENT MEDICAL HISTORY:    Past Medical History:   Diagnosis Date   • Backache     radiation to legs-abnormal MRI   • Benign hypertension    • Breast lump     left breast   • Chest pain    • Cough    • Degenerative joint disease involving multiple joints    • Epigastric pain    • Essential hypertension    • Fatigue    • Functional heart murmur    • Gestational diabetes mellitus    • Hypertensive disorder    • Intrinsic asthma with status asthmaticus    • Low back pain    • Lumbar disc lesion    • Lymphadenopathy    • Malaise and fatigue    • Migraine    • Neck pain     throat pain   • Non-IgE mediated allergic asthma    • Noncompliance with treatment    • Overweight    • Plantar fascial fibromatosis    • Plantar fasciitis of right foot    • Soft tissue swelling     right lateral chest wall   • Surgery follow-up    • Upper respiratory infection    • Wheezing        Allergies   Allergen Reactions   • Promethazine Hallucinations   • Adhesive Tape Swelling and Rash         Current Outpatient Prescriptions:   •  baclofen (LIORESAL) 10 MG tablet, Take 1 tablet by mouth 3 (Three) Times a Day., Disp: 90 tablet, Rfl: 5  •  cetirizine-pseudoephedrine (ZyrTEC-D) 5-120 MG per 12 hr tablet, Take 1 tablet by mouth 2 (Two) Times a Day As Needed for Rhinitis., Disp: 14 tablet, Rfl: 0  •  escitalopram (LEXAPRO) 5 MG tablet, Take 1  "tablet by mouth Daily., Disp: 30 tablet, Rfl: 11  •  escitalopram (LEXAPRO) 5 MG tablet, TAKE 1 TABLET BY MOUTH DAILY, Disp: 30 tablet, Rfl: 5  •  etodolac (LODINE) 300 MG capsule, Take 1 capsule by mouth 3 (Three) Times a Day As Needed (pain)., Disp: 30 capsule, Rfl: 0  •  losartan-hydrochlorothiazide (HYZAAR) 50-12.5 MG per tablet, Take 1 tablet by mouth Daily., Disp: 30 tablet, Rfl: 11    Past Surgical History:   Procedure Laterality Date   • BREAST BIOPSY  1998    left mass, fibrosis, mild   •  SECTION     • COLONOSCOPY  2005    normal colonoscopic exam.  Irritable bowel   • DILATATION AND CURETTAGE  1998    incomplete    • FOOT SURGERY  2015    partial plantar fasciectomy, right foot   • HYSTERECTOMY  2007    menorrhagia   • INJECTION OF MEDICATION  2014    dexamethasone (1)   • INJECTION OF MEDICATION  2014    inj Tend-Sheath Ligament  (3)   • INJECTION OF MEDICATION  2014    Kenalog (4)   • INJECTION OF MEDICATION  2011    Toradol (1)   • PAP SMEAR  10/15/1996    normal   • TUBAL ABDOMINAL LIGATION     • VAGINAL DELIVERY      x3       ROS  No fevers or chills.  No chest pain or shortness of air.  No GI or  disturbances. Left knee pain/swelling.     PHYSICAL EXAMINATION:       Ht 160 cm (63\")  Wt 87.1 kg (192 lb)  BMI 34.01 kg/m2    Physical Exam   Constitutional: She is oriented to person, place, and time. Vital signs are normal. She appears well-developed and well-nourished.   HENT:   Head: Normocephalic.   Pulmonary/Chest: Effort normal. No respiratory distress.   Abdominal: Soft. She exhibits no distension.   Musculoskeletal:        Right knee: She exhibits no effusion.        Left knee: She exhibits effusion (mild).   Neurological: She is alert and oriented to person, place, and time. GCS eye subscore is 4. GCS verbal subscore is 5. GCS motor subscore is 6.   Skin: Skin is warm, dry and intact.   Psychiatric: She has a normal " mood and affect. Her speech is normal and behavior is normal. Judgment and thought content normal. Cognition and memory are normal.   Vitals reviewed.      GAIT:     []  Normal  [x]  Antalgic    Assistive device: [x]  None  []  Walker     []  Crutches  []  Cane     []  Wheelchair  []  Stretcher    Right Knee Exam     Tenderness   The patient is experiencing no tenderness.         Range of Motion   The patient has normal right knee ROM.    Muscle Strength     The patient has normal right knee strength.    Tests   Tapan:  Medial - negative Lateral - negative  Varus: negative  Valgus: negative    Other   Erythema: absent  Sensation: normal  Pulse: present  Swelling: none  Other tests: no effusion present      Left Knee Exam     Tenderness   The patient is experiencing tenderness in the MCL and medial joint line.    Range of Motion   Extension: 0   Flexion: 110     Muscle Strength     The patient has normal left knee strength.    Tests   Tapan:  Medial - positive Lateral - positive  Varus: negative  Valgus: negative    Other   Erythema: absent  Sensation: normal  Pulse: present  Swelling: mild  Effusion: effusion (mild) present    Comments:  Pain with range of motion. Mild swelling of the knee, improved from previous exam.             Xr Knee 4+ View Left    Result Date: 1/18/2018  Narrative: Four view left knee HISTORY: Pain after falling AP, oblique, tunnel and lateral views obtained. COMPARISON: None No fracture or dislocation. No suprapatellar effusion. No other osseous or articular abnormality.     Impression: CONCLUSION: No fracture or dislocation [id number] Electronically signed by:  Theodore Carmichael MD  1/18/2018 9:20 AM CST Workstation: [id number]    MRI Knee Left Without Contrast    Result Date: 1/29/2018  Narrative: Procedure: MRI left knee without contrast Reason for exam: Knee pain. Internal derangement. FINDINGS: Multisequence multiplanar MR imaging of the left knee was performed without contrast. Bony  structures of the left knee are normal. There is no evidence of fracture or dislocation. No evidence of bone contusion or bone infarct. No chondromalacia. Small fluid collection adjacent to the semimembranosus tendon which has multiple septations. This fluid collection measures 2.7 x 2 x 1.9 cm. This would be suspicious for Baker's cyst. Small knee joint and suprapatellar joint effusion. The quadriceps femoris tendon is intact. The patella tendon is intact. The anterior cruciate ligament is intact. The posterior cruciate ligament is intact. The lateral collateral ligament is intact. There is increased soft tissue signal on the T2-weighted image medial to the medial collateral ligament. The medial collateral ligament is intact. This abnormal signal which suggests grade 1 medial collateral ligament sprain. The medial meniscus is intact. Lateral meniscus mid body truncated triangle sign which would be suspicious for radial tear in this region. Mild lateral extrusion of the lateral meniscus. Otherwise lateral meniscus unremarkable. Popliteus tendon is intact.     Impression: 1.  Small fluid collection adjacent to the semimembranosus tendon which has multiple septations. This fluid collection measures 2.7 x 2 x 1.9 cm. This would be suspicious for Hills's cyst. 2.  There is increased soft tissue signal on the T2-weighted image medial to the medial collateral ligament. The medial collateral ligament is intact. This abnormal signal which suggests grade 1 medial collateral ligament sprain. 3.  Mid body lateral meniscus truncated triangle sign with associated mild lateral extrusion of the lateral meniscus suspicious for changes from radial tear. 4.  Small suprapatellar and knee joint effusion. 5.  Otherwise unremarkable MR left knee. Electronically signed by:  Pranav Ch MD  1/29/2018 11:23 AM [facility] Workstation: [id number]      ASSESSMENT:    Diagnoses and all orders for this visit:    Acute pain of left knee  -     Large Joint  Arthrocentesis  -     Ambulatory Referral to Physical Therapy Evaluate and treat    Internal derangement of left knee  -     Large Joint Arthrocentesis  -     Ambulatory Referral to Physical Therapy Evaluate and treat    Sprain of medial collateral ligament of left knee, initial encounter        -     Large Joint Arthrocentesis  -     Ambulatory Referral to Physical Therapy Evaluate and treat    Knee effusion, left  -     Large Joint Arthrocentesis  -     Ambulatory Referral to Physical Therapy Evaluate and treat      Large Joint Arthrocentesis  Date/Time: 1/30/2018 10:05 AM  Consent given by: patient  Site marked: site marked  Timeout: Immediately prior to procedure a time out was called to verify the correct patient, procedure, equipment, support staff and site/side marked as required   Supporting Documentation  Indications: pain   Procedure Details  Location: knee - L knee  Preparation: Patient was prepped and draped in the usual sterile fashion  Needle size: 22 G  Approach: anteromedial  Medications administered: 40 mg triamcinolone acetonide 40 MG/ML; 2 mL lidocaine 1 %  Patient tolerance: patient tolerated the procedure well with no immediate complications        PLAN    MRI of left knee reviewed and results discussed with patient today.  Patient has had gradual improvement in pain and swelling since her initial injury on 1/18/2018.  Patient has been able to transition away from her crutches and progress her weightbearing.  Patient continues to wear hinged knee brace to the left knee for support/stability.  Recommend intra-articular injection of left knee today for pain/swelling.  Recommend to continue with elevation of the left knee to minimize swelling/pain.  Recommend to continue with ice therapy intermittently 3-4 times daily for 20 minutes at a time.  Recommend to continue with consistent dosing of oral NSAIDs, such as Ibuprofen or Aleve.  Continue with hinged knee brace and continue to slowly progress  activity and weightbearing as tolerated, based on her pain.  Recommend physical therapy for MCL strain and possible lateral meniscus tear.  Follow-up in 6 weeks for recheck.    Return in about 6 weeks (around 3/13/2018) for Recheck.      This document has been electronically signed by MERARI Ulloa on January 31, 2018 12:42 PM      MERARI Ulloa

## 2018-01-31 PROBLEM — M25.462 KNEE EFFUSION, LEFT: Status: ACTIVE | Noted: 2018-01-31

## 2018-01-31 PROBLEM — S83.412A SPRAIN OF MEDIAL COLLATERAL LIGAMENT OF LEFT KNEE: Status: ACTIVE | Noted: 2018-01-31

## 2018-01-31 RX ORDER — TRIAMCINOLONE ACETONIDE 40 MG/ML
40 INJECTION, SUSPENSION INTRA-ARTICULAR; INTRAMUSCULAR
Status: COMPLETED | OUTPATIENT
Start: 2018-01-30 | End: 2018-01-30

## 2018-01-31 RX ORDER — LIDOCAINE HYDROCHLORIDE 10 MG/ML
2 INJECTION, SOLUTION INFILTRATION; PERINEURAL
Status: COMPLETED | OUTPATIENT
Start: 2018-01-30 | End: 2018-01-30

## 2018-06-11 ENCOUNTER — OFFICE VISIT (OUTPATIENT)
Dept: FAMILY MEDICINE CLINIC | Facility: CLINIC | Age: 43
End: 2018-06-11

## 2018-06-11 VITALS
HEIGHT: 63 IN | SYSTOLIC BLOOD PRESSURE: 124 MMHG | BODY MASS INDEX: 33.66 KG/M2 | WEIGHT: 190 LBS | DIASTOLIC BLOOD PRESSURE: 82 MMHG

## 2018-06-11 DIAGNOSIS — M54.2 NECK PAIN: Primary | ICD-10-CM

## 2018-06-11 DIAGNOSIS — F41.9 ANXIETY: ICD-10-CM

## 2018-06-11 DIAGNOSIS — K11.8 PAROTID GLAND PAIN: ICD-10-CM

## 2018-06-11 PROCEDURE — 99213 OFFICE O/P EST LOW 20 MIN: CPT | Performed by: NURSE PRACTITIONER

## 2018-06-11 RX ORDER — CEFUROXIME AXETIL 500 MG/1
500 TABLET ORAL 2 TIMES DAILY
Qty: 20 TABLET | Refills: 11 | Status: SHIPPED | OUTPATIENT
Start: 2018-06-11 | End: 2019-07-01

## 2018-06-11 RX ORDER — ESCITALOPRAM OXALATE 5 MG/1
5 TABLET ORAL 2 TIMES DAILY
Qty: 60 TABLET | Refills: 11 | Status: SHIPPED | OUTPATIENT
Start: 2018-06-11 | End: 2018-11-12 | Stop reason: SDUPTHER

## 2018-06-11 NOTE — PROGRESS NOTES
Chief Complaint   Patient presents with   • Neck Pain     Subjective   Karen Estrada is a 42 y.o. female.     Neck Pain    This is a recurrent problem. The current episode started more than 1 month ago. The problem occurs intermittently. The problem has been gradually worsening. The pain is associated with nothing. The pain is present in the anterior neck. The quality of the pain is described as aching. The pain is at a severity of 10/10. The pain is severe. The symptoms are aggravated by bending. The pain is same all the time. Stiffness is present all day. Associated symptoms include headaches and numbness. Pertinent negatives include no chest pain, fever, leg pain, pain with swallowing, paresis, photophobia, syncope, trouble swallowing, visual change, weakness or weight loss. She has tried acetaminophen, bed rest, home exercises, heat, chiropractic manipulation, ice, muscle relaxants, neck support and NSAIDs for the symptoms. The treatment provided mild relief.   Anxiety   Presents for follow-up visit. Symptoms include excessive worry, insomnia, irritability, nervous/anxious behavior and panic. Patient reports no chest pain, compulsions, confusion, decreased concentration, depressed mood, dizziness, dry mouth, feeling of choking, hyperventilation, impotence, malaise, muscle tension, nausea, obsessions, palpitations, restlessness, shortness of breath or suicidal ideas. Symptoms occur most days.            The following portions of the patient's history were reviewed and updated as appropriate: allergies, current medications, past social history and problem list.    Review of Systems   Constitutional: Positive for irritability. Negative for activity change, appetite change, chills, fever and weight loss.   HENT: Negative for congestion and trouble swallowing.         Left side of throat swollen and tender    Eyes: Negative.  Negative for photophobia.   Respiratory: Negative.  Negative for apnea, cough,  "choking, chest tightness, shortness of breath, wheezing and stridor.    Cardiovascular: Negative.  Negative for chest pain, palpitations, leg swelling and syncope.   Gastrointestinal: Negative.  Negative for abdominal distention, abdominal pain, anal bleeding, blood in stool, nausea and vomiting.   Endocrine: Negative.  Negative for cold intolerance, heat intolerance, polydipsia, polyphagia and polyuria.   Genitourinary: Negative for decreased urine volume, difficulty urinating, dyspareunia, dysuria, enuresis, flank pain, frequency, genital sores, hematuria, impotence, menstrual problem, pelvic pain, urgency, vaginal bleeding, vaginal discharge and vaginal pain.   Musculoskeletal: Positive for arthralgias, back pain, gait problem, joint swelling, myalgias, neck pain and neck stiffness.   Skin: Negative.  Negative for color change, pallor, rash and wound.   Allergic/Immunologic: Negative.  Negative for environmental allergies, food allergies and immunocompromised state.   Neurological: Positive for numbness and headaches. Negative for dizziness, tremors, seizures, syncope, facial asymmetry, speech difficulty, weakness and light-headedness.   Hematological: Negative.  Negative for adenopathy. Does not bruise/bleed easily.   Psychiatric/Behavioral: Positive for agitation and sleep disturbance. Negative for behavioral problems, confusion, decreased concentration, dysphoric mood, hallucinations, self-injury and suicidal ideas. The patient is nervous/anxious and has insomnia. The patient is not hyperactive.    All other systems reviewed and are negative.      Objective   /82   Ht 160 cm (63\")   Wt 86.2 kg (190 lb)   BMI 33.66 kg/m²   Physical Exam   Constitutional: She is oriented to person, place, and time. She appears well-developed and well-nourished.   HENT:   Head: Normocephalic and atraumatic.   Parotid swelling and tender   Eyes: EOM are normal. Pupils are equal, round, and reactive to light. Right eye " exhibits no discharge. Left eye exhibits no discharge. No scleral icterus.   Neck: Normal range of motion. Neck supple. No JVD present. No tracheal deviation present. No thyromegaly present.   Cardiovascular: Normal rate, regular rhythm, normal heart sounds and normal pulses.  Exam reveals no gallop and no friction rub.    No murmur heard.  Pulmonary/Chest: Effort normal and breath sounds normal. No stridor. No respiratory distress. She has no wheezes. She has no rales. She exhibits no tenderness.   Abdominal: Soft. Bowel sounds are normal. She exhibits no distension and no mass. There is no tenderness. There is no rebound and no guarding. No hernia.   Genitourinary: Rectum normal and vagina normal. Pelvic exam was performed with patient supine. Uterus is not deviated, not enlarged, not fixed and not tender. Cervix exhibits no motion tenderness, no discharge and no friability. Right adnexum displays no mass. Left adnexum displays no mass.   Musculoskeletal: Normal range of motion. She exhibits tenderness. She exhibits no edema or deformity.        Right shoulder: She exhibits pain and spasm. She exhibits normal range of motion, no tenderness, no bony tenderness, no swelling, no effusion, no crepitus, no deformity, no laceration, normal pulse and normal strength.        Left elbow: She exhibits swelling. Tenderness found.        Cervical back: She exhibits no tenderness, no bony tenderness, no swelling, no edema, no deformity, no laceration, no pain, no spasm and normal pulse.        Lumbar back: She exhibits tenderness, bony tenderness, pain and spasm. She exhibits no swelling, no edema, no deformity, no laceration and normal pulse.        Back:    Lymphadenopathy:     She has no cervical adenopathy.   Neurological: She is alert and oriented to person, place, and time. She has normal reflexes. She displays normal reflexes. No cranial nerve deficit. She exhibits normal muscle tone. Coordination normal.   Skin: Skin  is warm and dry. No rash noted. No erythema. No pallor.   Psychiatric: She has a normal mood and affect.   Nursing note and vitals reviewed.      Assessment/Plan   Problem List Items Addressed This Visit        Nervous and Auditory    Neck pain - Primary       Other    Parotid gland pain    Anxiety           New Medications Ordered This Visit   Medications   • cefuroxime (CEFTIN) 500 MG tablet     Sig: Take 1 tablet by mouth 2 (Two) Times a Day.     Dispense:  20 tablet     Refill:  11   • escitalopram (LEXAPRO) 5 MG tablet     Sig: Take 1 tablet by mouth 2 (Two) Times a Day.     Dispense:  60 tablet     Refill:  11       It's not just what you eat, but when you eat  Eat breakfast, and eat smaller meals throughout the day. A healthy breakfast can jumpstart your metabolism, while eating small, healthy meals (rather than the standard three large meals) keeps your energy up.   Avoid eating at night. Try to eat dinner earlier and fast for 14-16 hours until breakfast the next morning. Studies suggest that eating only when you’re most active and giving your digestive system a long break each day may help to regulate weight.

## 2018-07-30 ENCOUNTER — HOSPITAL ENCOUNTER (EMERGENCY)
Facility: HOSPITAL | Age: 43
Discharge: HOME | End: 2018-07-30
Attending: FAMILY MEDICINE | Admitting: FAMILY MEDICINE

## 2018-07-30 ENCOUNTER — APPOINTMENT (OUTPATIENT)
Dept: CT IMAGING | Facility: HOSPITAL | Age: 43
End: 2018-07-30

## 2018-07-30 VITALS
OXYGEN SATURATION: 99 % | SYSTOLIC BLOOD PRESSURE: 138 MMHG | BODY MASS INDEX: 31.89 KG/M2 | RESPIRATION RATE: 18 BRPM | HEART RATE: 78 BPM | DIASTOLIC BLOOD PRESSURE: 74 MMHG | HEIGHT: 63 IN | TEMPERATURE: 97.5 F | WEIGHT: 180 LBS

## 2018-07-30 DIAGNOSIS — E86.0 MILD DEHYDRATION: ICD-10-CM

## 2018-07-30 DIAGNOSIS — R11.2 NON-INTRACTABLE VOMITING WITH NAUSEA, UNSPECIFIED VOMITING TYPE: Primary | ICD-10-CM

## 2018-07-30 DIAGNOSIS — E87.6 HYPOKALEMIA: ICD-10-CM

## 2018-07-30 LAB
ALBUMIN SERPL-MCNC: 5 G/DL (ref 3.4–4.8)
ALBUMIN/GLOB SERPL: 1.4 G/DL (ref 1.1–1.8)
ALP SERPL-CCNC: 73 U/L (ref 38–126)
ALT SERPL W P-5'-P-CCNC: 68 U/L (ref 9–52)
ANION GAP SERPL CALCULATED.3IONS-SCNC: 13 MMOL/L (ref 5–15)
AST SERPL-CCNC: 52 U/L (ref 14–36)
B-HCG UR QL: NEGATIVE
BACTERIA UR QL AUTO: ABNORMAL /HPF
BASOPHILS # BLD AUTO: 0.03 10*3/MM3 (ref 0–0.2)
BASOPHILS NFR BLD AUTO: 0.3 % (ref 0–2)
BILIRUB SERPL-MCNC: 0.3 MG/DL (ref 0.2–1.3)
BILIRUB UR QL STRIP: NEGATIVE
BUN BLD-MCNC: 9 MG/DL (ref 7–21)
BUN/CREAT SERPL: 17 (ref 7–25)
CALCIUM SPEC-SCNC: 9.8 MG/DL (ref 8.4–10.2)
CHLORIDE SERPL-SCNC: 101 MMOL/L (ref 95–110)
CLARITY UR: CLEAR
CO2 SERPL-SCNC: 24 MMOL/L (ref 22–31)
COLOR UR: YELLOW
CREAT BLD-MCNC: 0.53 MG/DL (ref 0.5–1)
DEPRECATED RDW RBC AUTO: 40.2 FL (ref 36.4–46.3)
EOSINOPHIL # BLD AUTO: 0.08 10*3/MM3 (ref 0–0.7)
EOSINOPHIL NFR BLD AUTO: 0.8 % (ref 0–7)
ERYTHROCYTE [DISTWIDTH] IN BLOOD BY AUTOMATED COUNT: 13.4 % (ref 11.5–14.5)
GFR SERPL CREATININE-BSD FRML MDRD: 125 ML/MIN/1.73 (ref 58–135)
GLOBULIN UR ELPH-MCNC: 3.7 GM/DL (ref 2.3–3.5)
GLUCOSE BLD-MCNC: 115 MG/DL (ref 60–100)
GLUCOSE UR STRIP-MCNC: NEGATIVE MG/DL
HCT VFR BLD AUTO: 38.5 % (ref 35–45)
HGB BLD-MCNC: 13.3 G/DL (ref 12–15.5)
HGB UR QL STRIP.AUTO: ABNORMAL
HOLD SPECIMEN: NORMAL
HYALINE CASTS UR QL AUTO: ABNORMAL /LPF
IMM GRANULOCYTES # BLD: 0.03 10*3/MM3 (ref 0–0.02)
IMM GRANULOCYTES NFR BLD: 0.3 % (ref 0–0.5)
KETONES UR QL STRIP: NEGATIVE
LEUKOCYTE ESTERASE UR QL STRIP.AUTO: NEGATIVE
LIPASE SERPL-CCNC: 63 U/L (ref 23–300)
LYMPHOCYTES # BLD AUTO: 2.2 10*3/MM3 (ref 0.6–4.2)
LYMPHOCYTES NFR BLD AUTO: 22.8 % (ref 10–50)
MCH RBC QN AUTO: 28.7 PG (ref 26.5–34)
MCHC RBC AUTO-ENTMCNC: 34.5 G/DL (ref 31.4–36)
MCV RBC AUTO: 83.2 FL (ref 80–98)
MONOCYTES # BLD AUTO: 0.66 10*3/MM3 (ref 0–0.9)
MONOCYTES NFR BLD AUTO: 6.8 % (ref 0–12)
NEUTROPHILS # BLD AUTO: 6.64 10*3/MM3 (ref 2–8.6)
NEUTROPHILS NFR BLD AUTO: 69 % (ref 37–80)
NITRITE UR QL STRIP: NEGATIVE
PH UR STRIP.AUTO: 7 [PH] (ref 5–9)
PLATELET # BLD AUTO: 329 10*3/MM3 (ref 150–450)
PMV BLD AUTO: 9.8 FL (ref 8–12)
POTASSIUM BLD-SCNC: 3.1 MMOL/L (ref 3.5–5.1)
PROT SERPL-MCNC: 8.7 G/DL (ref 6.3–8.6)
PROT UR QL STRIP: ABNORMAL
RBC # BLD AUTO: 4.63 10*6/MM3 (ref 3.77–5.16)
RBC # UR: ABNORMAL /HPF
REF LAB TEST METHOD: ABNORMAL
SODIUM BLD-SCNC: 138 MMOL/L (ref 137–145)
SP GR UR STRIP: 1.02 (ref 1–1.03)
SQUAMOUS #/AREA URNS HPF: ABNORMAL /HPF
UROBILINOGEN UR QL STRIP: ABNORMAL
WBC NRBC COR # BLD: 9.64 10*3/MM3 (ref 3.2–9.8)
WBC UR QL AUTO: ABNORMAL /HPF
WHOLE BLOOD HOLD SPECIMEN: NORMAL

## 2018-07-30 PROCEDURE — 81025 URINE PREGNANCY TEST: CPT | Performed by: PHYSICIAN ASSISTANT

## 2018-07-30 PROCEDURE — 96375 TX/PRO/DX INJ NEW DRUG ADDON: CPT

## 2018-07-30 PROCEDURE — 25010000002 MORPHINE PER 10 MG: Performed by: FAMILY MEDICINE

## 2018-07-30 PROCEDURE — 81001 URINALYSIS AUTO W/SCOPE: CPT | Performed by: PHYSICIAN ASSISTANT

## 2018-07-30 PROCEDURE — 80053 COMPREHEN METABOLIC PANEL: CPT | Performed by: PHYSICIAN ASSISTANT

## 2018-07-30 PROCEDURE — 83690 ASSAY OF LIPASE: CPT | Performed by: PHYSICIAN ASSISTANT

## 2018-07-30 PROCEDURE — 85025 COMPLETE CBC W/AUTO DIFF WBC: CPT | Performed by: PHYSICIAN ASSISTANT

## 2018-07-30 PROCEDURE — 96361 HYDRATE IV INFUSION ADD-ON: CPT

## 2018-07-30 PROCEDURE — 70450 CT HEAD/BRAIN W/O DYE: CPT

## 2018-07-30 PROCEDURE — 25010000002 ONDANSETRON PER 1 MG: Performed by: PHYSICIAN ASSISTANT

## 2018-07-30 PROCEDURE — 96374 THER/PROPH/DIAG INJ IV PUSH: CPT

## 2018-07-30 PROCEDURE — 99284 EMERGENCY DEPT VISIT MOD MDM: CPT

## 2018-07-30 RX ORDER — IBUPROFEN 800 MG/1
800 TABLET ORAL DAILY
COMMUNITY
End: 2018-11-12 | Stop reason: SDUPTHER

## 2018-07-30 RX ORDER — MORPHINE SULFATE 2 MG/ML
2 INJECTION, SOLUTION INTRAMUSCULAR; INTRAVENOUS ONCE
Status: COMPLETED | OUTPATIENT
Start: 2018-07-30 | End: 2018-07-30

## 2018-07-30 RX ORDER — ACETAMINOPHEN 500 MG
1000 TABLET ORAL ONCE
Status: COMPLETED | OUTPATIENT
Start: 2018-07-30 | End: 2018-07-30

## 2018-07-30 RX ORDER — SODIUM CHLORIDE 0.9 % (FLUSH) 0.9 %
10 SYRINGE (ML) INJECTION AS NEEDED
Status: DISCONTINUED | OUTPATIENT
Start: 2018-07-30 | End: 2018-07-30 | Stop reason: HOSPADM

## 2018-07-30 RX ORDER — POTASSIUM CHLORIDE 750 MG/1
40 CAPSULE, EXTENDED RELEASE ORAL ONCE
Status: COMPLETED | OUTPATIENT
Start: 2018-07-30 | End: 2018-07-30

## 2018-07-30 RX ORDER — ONDANSETRON 2 MG/ML
4 INJECTION INTRAMUSCULAR; INTRAVENOUS ONCE
Status: COMPLETED | OUTPATIENT
Start: 2018-07-30 | End: 2018-07-30

## 2018-07-30 RX ADMIN — ACETAMINOPHEN 1000 MG: 500 TABLET ORAL at 13:05

## 2018-07-30 RX ADMIN — POTASSIUM CHLORIDE 40 MEQ: 750 CAPSULE, EXTENDED RELEASE ORAL at 14:09

## 2018-07-30 RX ADMIN — SODIUM CHLORIDE 1000 ML: 900 INJECTION, SOLUTION INTRAVENOUS at 12:50

## 2018-07-30 RX ADMIN — SODIUM CHLORIDE 1000 ML: 900 INJECTION, SOLUTION INTRAVENOUS at 14:10

## 2018-07-30 RX ADMIN — MORPHINE SULFATE 2 MG: 2 INJECTION, SOLUTION INTRAMUSCULAR; INTRAVENOUS at 16:06

## 2018-07-30 RX ADMIN — ONDANSETRON HYDROCHLORIDE 4 MG: 2 INJECTION, SOLUTION INTRAMUSCULAR; INTRAVENOUS at 12:50

## 2018-11-12 ENCOUNTER — TELEPHONE (OUTPATIENT)
Dept: FAMILY MEDICINE CLINIC | Facility: CLINIC | Age: 43
End: 2018-11-12

## 2018-11-12 RX ORDER — ESCITALOPRAM OXALATE 5 MG/1
5 TABLET ORAL 2 TIMES DAILY
Qty: 60 TABLET | Refills: 11 | Status: SHIPPED | OUTPATIENT
Start: 2018-11-12 | End: 2018-11-19 | Stop reason: SDUPTHER

## 2018-11-12 RX ORDER — IBUPROFEN 800 MG/1
800 TABLET ORAL DAILY
Qty: 30 TABLET | Refills: 5 | Status: SHIPPED | OUTPATIENT
Start: 2018-11-12 | End: 2019-05-24 | Stop reason: SDUPTHER

## 2018-11-12 RX ORDER — LOSARTAN POTASSIUM AND HYDROCHLOROTHIAZIDE 12.5; 5 MG/1; MG/1
1 TABLET ORAL DAILY
Qty: 30 TABLET | Refills: 11 | Status: SHIPPED | OUTPATIENT
Start: 2018-11-12 | End: 2019-07-01

## 2018-11-12 NOTE — TELEPHONE ENCOUNTER
She called and said she needs to get refills for these meds:  lexapro-ibuprofen and losartan hctz . She uses [email or web address] and can be reached at [phone].

## 2018-11-19 RX ORDER — ESCITALOPRAM OXALATE 5 MG/1
5 TABLET ORAL 2 TIMES DAILY
Qty: 60 TABLET | Refills: 11 | Status: SHIPPED | OUTPATIENT
Start: 2018-11-19 | End: 2019-07-01

## 2018-11-19 RX ORDER — ESCITALOPRAM OXALATE 5 MG/1
5 TABLET ORAL DAILY
Qty: 30 TABLET | Refills: 0 | Status: SHIPPED | OUTPATIENT
Start: 2018-11-19 | End: 2020-07-27

## 2019-05-24 ENCOUNTER — TELEPHONE (OUTPATIENT)
Dept: FAMILY MEDICINE CLINIC | Facility: CLINIC | Age: 44
End: 2019-05-24

## 2019-05-24 RX ORDER — IBUPROFEN 800 MG/1
800 TABLET ORAL DAILY
Qty: 30 TABLET | Refills: 1 | Status: SHIPPED | OUTPATIENT
Start: 2019-05-24 | End: 2019-07-01 | Stop reason: SDUPTHER

## 2019-05-28 RX ORDER — IBUPROFEN 800 MG/1
800 TABLET ORAL DAILY
Qty: 30 TABLET | Refills: 0 | OUTPATIENT
Start: 2019-05-28

## 2019-07-01 ENCOUNTER — OFFICE VISIT (OUTPATIENT)
Dept: FAMILY MEDICINE CLINIC | Facility: CLINIC | Age: 44
End: 2019-07-01

## 2019-07-01 ENCOUNTER — APPOINTMENT (OUTPATIENT)
Dept: LAB | Facility: HOSPITAL | Age: 44
End: 2019-07-01

## 2019-07-01 VITALS
BODY MASS INDEX: 34.55 KG/M2 | DIASTOLIC BLOOD PRESSURE: 90 MMHG | WEIGHT: 195 LBS | SYSTOLIC BLOOD PRESSURE: 130 MMHG | HEIGHT: 63 IN

## 2019-07-01 DIAGNOSIS — I10 HYPERTENSION, UNSPECIFIED TYPE: Primary | ICD-10-CM

## 2019-07-01 DIAGNOSIS — Z12.31 ENCOUNTER FOR SCREENING MAMMOGRAM FOR MALIGNANT NEOPLASM OF BREAST: ICD-10-CM

## 2019-07-01 DIAGNOSIS — M25.50 ARTHRALGIA, UNSPECIFIED JOINT: ICD-10-CM

## 2019-07-01 DIAGNOSIS — R53.83 MALAISE AND FATIGUE: ICD-10-CM

## 2019-07-01 DIAGNOSIS — R53.81 MALAISE AND FATIGUE: ICD-10-CM

## 2019-07-01 PROCEDURE — 86431 RHEUMATOID FACTOR QUANT: CPT | Performed by: NURSE PRACTITIONER

## 2019-07-01 PROCEDURE — 82306 VITAMIN D 25 HYDROXY: CPT | Performed by: NURSE PRACTITIONER

## 2019-07-01 PROCEDURE — 99214 OFFICE O/P EST MOD 30 MIN: CPT | Performed by: NURSE PRACTITIONER

## 2019-07-01 PROCEDURE — 36415 COLL VENOUS BLD VENIPUNCTURE: CPT | Performed by: NURSE PRACTITIONER

## 2019-07-01 PROCEDURE — 84443 ASSAY THYROID STIM HORMONE: CPT | Performed by: NURSE PRACTITIONER

## 2019-07-01 PROCEDURE — 85025 COMPLETE CBC W/AUTO DIFF WBC: CPT | Performed by: NURSE PRACTITIONER

## 2019-07-01 PROCEDURE — 82607 VITAMIN B-12: CPT | Performed by: NURSE PRACTITIONER

## 2019-07-01 PROCEDURE — 83540 ASSAY OF IRON: CPT | Performed by: NURSE PRACTITIONER

## 2019-07-01 PROCEDURE — 83735 ASSAY OF MAGNESIUM: CPT | Performed by: NURSE PRACTITIONER

## 2019-07-01 PROCEDURE — 80053 COMPREHEN METABOLIC PANEL: CPT | Performed by: NURSE PRACTITIONER

## 2019-07-01 RX ORDER — LOSARTAN POTASSIUM AND HYDROCHLOROTHIAZIDE 25; 100 MG/1; MG/1
1 TABLET ORAL DAILY
Qty: 30 TABLET | Refills: 11 | Status: SHIPPED | OUTPATIENT
Start: 2019-07-01 | End: 2020-06-22

## 2019-07-01 RX ORDER — IBUPROFEN 800 MG/1
800 TABLET ORAL EVERY 8 HOURS PRN
Qty: 90 TABLET | Refills: 11 | Status: SHIPPED | OUTPATIENT
Start: 2019-07-01 | End: 2020-07-27

## 2019-07-01 NOTE — PROGRESS NOTES
Chief Complaint   Patient presents with   • Neck Pain     6 month check up    • Back Pain   • Leg Swelling     and feet     Subjective   Karen Estrada is a 43 y.o. female.     Back Pain   This is a recurrent problem. The current episode started more than 1 month ago. The problem occurs constantly. The problem has been rapidly worsening since onset. The pain is present in the lumbar spine and sacro-iliac. The quality of the pain is described as burning, cramping, shooting and stabbing. The pain radiates to the right foot, right knee and right thigh. The pain is at a severity of 10/10. The pain is severe. The pain is the same all the time. The symptoms are aggravated by lying down, position, sitting, standing, twisting and bending. Stiffness is present all day. Associated symptoms include bladder incontinence, leg pain, paresis, paresthesias, pelvic pain and tingling. Pertinent negatives include no bowel incontinence, chest pain, dysuria, headaches, perianal numbness, weakness or weight loss. She has tried analgesics, walking, NSAIDs, heat, home exercises, chiropractic manipulation, ice, muscle relaxant and bed rest (symptoms are worsening with treatment ) for the symptoms. The treatment provided mild relief.   Hypertension   This is a chronic problem. The current episode started more than 1 year ago. The problem has been waxing and waning since onset. The problem is uncontrolled. Pertinent negatives include no anxiety, blurred vision, chest pain, headaches, malaise/fatigue, neck pain, orthopnea, palpitations, peripheral edema, PND, shortness of breath or sweats. Risk factors for coronary artery disease include family history and sedentary lifestyle. Past treatments include alpha 1 blockers. Current antihypertension treatment includes alpha 1 blockers. The current treatment provides moderate improvement. There is no history of angina, kidney disease, CAD/MI, CVA, heart failure, left ventricular hypertrophy,  PVD or retinopathy.        The following portions of the patient's history were reviewed and updated as appropriate: allergies, current medications, past social history and problem list.    Review of Systems   Constitutional: Positive for diaphoresis and fatigue. Negative for activity change, appetite change, chills, malaise/fatigue and weight loss.   HENT: Negative for dental problem, drooling, ear discharge, ear pain and trouble swallowing.         Left side of throat swollen and tender    Eyes: Negative.  Negative for blurred vision, photophobia, pain, redness and itching.   Respiratory: Negative.  Negative for apnea, choking, chest tightness, shortness of breath, wheezing and stridor.    Cardiovascular: Positive for leg swelling. Negative for chest pain, palpitations, orthopnea and PND.        Distal leg edema-both feet    Gastrointestinal: Negative.  Negative for abdominal distention, anal bleeding, blood in stool, bowel incontinence, constipation, diarrhea and rectal pain.   Endocrine: Negative.  Negative for cold intolerance, heat intolerance, polydipsia, polyphagia and polyuria.   Genitourinary: Positive for bladder incontinence and pelvic pain. Negative for decreased urine volume, difficulty urinating, dyspareunia, dysuria, enuresis, flank pain, frequency, genital sores, hematuria, menstrual problem, urgency, vaginal bleeding, vaginal discharge and vaginal pain.   Musculoskeletal: Positive for arthralgias, back pain, gait problem, joint swelling, myalgias and neck stiffness. Negative for neck pain.        Reports leg cramps and fatigue -pain worsening with age    Skin: Negative.  Negative for color change, pallor and wound.   Allergic/Immunologic: Negative.  Negative for environmental allergies, food allergies and immunocompromised state.   Neurological: Positive for tingling and paresthesias. Negative for dizziness, tremors, seizures, syncope, facial asymmetry, speech difficulty, weakness, light-headedness  "and headaches.   Hematological: Negative.  Negative for adenopathy. Does not bruise/bleed easily.   Psychiatric/Behavioral: Positive for agitation and sleep disturbance. Negative for behavioral problems, confusion, decreased concentration, dysphoric mood, hallucinations, self-injury and suicidal ideas. The patient is nervous/anxious. The patient is not hyperactive.    All other systems reviewed and are negative.      Objective   /90   Ht 160 cm (63\")   Wt 88.5 kg (195 lb)   BMI 34.54 kg/m²   Physical Exam   Constitutional: She is oriented to person, place, and time. She appears well-developed and well-nourished. No distress.   HENT:   Head: Normocephalic and atraumatic.   Right Ear: External ear normal.   Left Ear: External ear normal.   Mouth/Throat: Oropharynx is clear and moist. No oropharyngeal exudate.   Reports joint pain and pressure-pain throughout body    Eyes: EOM are normal. Pupils are equal, round, and reactive to light. Right eye exhibits no discharge. Left eye exhibits no discharge. No scleral icterus.   Neck: Normal range of motion. Neck supple. No JVD present. No tracheal deviation present. No thyromegaly present.   Cardiovascular: Normal rate, regular rhythm, normal heart sounds and normal pulses. Exam reveals no gallop and no friction rub.   No murmur heard.  Pulmonary/Chest: Effort normal and breath sounds normal. No stridor. No respiratory distress. She has no wheezes. She has no rales. She exhibits no tenderness.   Abdominal: Soft. Bowel sounds are normal. She exhibits no distension and no mass. There is no tenderness. There is no rebound and no guarding. No hernia.   Genitourinary: Rectum normal and vagina normal. Pelvic exam was performed with patient supine. Uterus is not deviated, not enlarged, not fixed and not tender. Cervix exhibits no motion tenderness, no discharge and no friability. Right adnexum displays no mass. Left adnexum displays no mass.   Musculoskeletal: Normal range " of motion. She exhibits tenderness. She exhibits no edema or deformity.        Right shoulder: She exhibits pain and spasm. She exhibits normal range of motion, no tenderness, no bony tenderness, no swelling, no effusion, no crepitus, no deformity, no laceration, normal pulse and normal strength.        Left elbow: She exhibits swelling. Tenderness found.        Cervical back: She exhibits no tenderness, no bony tenderness, no swelling, no edema, no deformity, no laceration, no pain, no spasm and normal pulse.        Lumbar back: She exhibits tenderness, bony tenderness, pain and spasm. She exhibits no swelling, no edema, no deformity, no laceration and normal pulse.        Back:    Reports leg cramps and fatigue -allover joint pain    Lymphadenopathy:     She has no cervical adenopathy.   Neurological: She is alert and oriented to person, place, and time. She has normal reflexes. She displays normal reflexes. No cranial nerve deficit or sensory deficit. She exhibits normal muscle tone. Coordination normal.   Skin: Skin is warm and dry. No rash noted. She is not diaphoretic. No erythema. No pallor.   Psychiatric: She has a normal mood and affect.   Nursing note and vitals reviewed.      Assessment/Plan   Problem List Items Addressed This Visit        Cardiovascular and Mediastinum    Hypertension - Primary    Relevant Medications    losartan-hydrochlorothiazide (HYZAAR) 100-25 MG per tablet    Other Relevant Orders    CBC & Differential    Comprehensive Metabolic Panel    Iron    Vitamin D 25 Hydroxy    Vitamin B12    TSH    Magnesium    Rheumatoid Factor    CBC Auto Differential       Nervous and Auditory    Arthralgia    Relevant Orders    CBC & Differential    Comprehensive Metabolic Panel    Iron    Vitamin D 25 Hydroxy    Vitamin B12    TSH    Magnesium    Rheumatoid Factor    CBC Auto Differential       Other    Malaise and fatigue    Relevant Orders    CBC & Differential    Comprehensive Metabolic Panel     Iron    Vitamin D 25 Hydroxy    Vitamin B12    TSH    Magnesium    Rheumatoid Factor    CBC Auto Differential    Encounter for screening mammogram for malignant neoplasm of breast    Relevant Orders    Mammo Screening Digital Tomosynthesis Bilateral With CAD    Rheumatoid Factor           New Medications Ordered This Visit   Medications   • ibuprofen (ADVIL,MOTRIN) 800 MG tablet     Sig: Take 1 tablet by mouth Every 8 (Eight) Hours As Needed for Mild Pain .     Dispense:  90 tablet     Refill:  11     Please Call MERARI Jones's  Office for an appointment before additional refills are needed.  Time for Yearly Check Up.   • losartan-hydrochlorothiazide (HYZAAR) 100-25 MG per tablet     Sig: Take 1 tablet by mouth Daily.     Dispense:  30 tablet     Refill:  11       It's not just what you eat, but when you eat  Eat breakfast, and eat smaller meals throughout the day. A healthy breakfast can jumpstart your metabolism, while eating small, healthy meals (rather than the standard three large meals) keeps your energy up.   Avoid eating at night. Try to eat dinner earlier and fast for 14-16 hours until breakfast the next morning. Studies suggest that eating only when you’re most active and giving your digestive system a long break each day may help to regulate weight.   Increase bp meds as directed, refill advil, meds as directed, labs today as directed, mammogram today

## 2019-07-02 LAB
25(OH)D3 SERPL-MCNC: 31.5 NG/ML (ref 30–100)
ALBUMIN SERPL-MCNC: 4.6 G/DL (ref 3.5–5.2)
ALBUMIN/GLOB SERPL: 1.5 G/DL
ALP SERPL-CCNC: 66 U/L (ref 39–117)
ALT SERPL W P-5'-P-CCNC: 28 U/L (ref 1–33)
ANION GAP SERPL CALCULATED.3IONS-SCNC: 14.1 MMOL/L (ref 5–15)
AST SERPL-CCNC: 30 U/L (ref 1–32)
BASOPHILS # BLD AUTO: 0.07 10*3/MM3 (ref 0–0.2)
BASOPHILS NFR BLD AUTO: 0.5 % (ref 0–1.5)
BILIRUB SERPL-MCNC: <0.2 MG/DL (ref 0.2–1.2)
BUN BLD-MCNC: 14 MG/DL (ref 6–20)
BUN/CREAT SERPL: 18.2 (ref 7–25)
CALCIUM SPEC-SCNC: 10.1 MG/DL (ref 8.6–10.5)
CHLORIDE SERPL-SCNC: 97 MMOL/L (ref 98–107)
CHROMATIN AB SERPL-ACNC: <10 IU/ML (ref 0–14)
CO2 SERPL-SCNC: 24.9 MMOL/L (ref 22–29)
CREAT BLD-MCNC: 0.77 MG/DL (ref 0.57–1)
DEPRECATED RDW RBC AUTO: 41.4 FL (ref 37–54)
EOSINOPHIL # BLD AUTO: 0.29 10*3/MM3 (ref 0–0.4)
EOSINOPHIL NFR BLD AUTO: 2.2 % (ref 0.3–6.2)
ERYTHROCYTE [DISTWIDTH] IN BLOOD BY AUTOMATED COUNT: 13 % (ref 12.3–15.4)
GFR SERPL CREATININE-BSD FRML MDRD: 81 ML/MIN/1.73
GLOBULIN UR ELPH-MCNC: 3 GM/DL
GLUCOSE BLD-MCNC: 84 MG/DL (ref 65–99)
HCT VFR BLD AUTO: 39.7 % (ref 34–46.6)
HGB BLD-MCNC: 13 G/DL (ref 12–15.9)
IMM GRANULOCYTES # BLD AUTO: 0.13 10*3/MM3 (ref 0–0.05)
IMM GRANULOCYTES NFR BLD AUTO: 1 % (ref 0–0.5)
IRON 24H UR-MRATE: 90 MCG/DL (ref 37–145)
LYMPHOCYTES # BLD AUTO: 4 10*3/MM3 (ref 0.7–3.1)
LYMPHOCYTES NFR BLD AUTO: 30.2 % (ref 19.6–45.3)
MAGNESIUM SERPL-MCNC: 2.1 MG/DL (ref 1.6–2.6)
MCH RBC QN AUTO: 28.8 PG (ref 26.6–33)
MCHC RBC AUTO-ENTMCNC: 32.7 G/DL (ref 31.5–35.7)
MCV RBC AUTO: 87.8 FL (ref 79–97)
MONOCYTES # BLD AUTO: 0.88 10*3/MM3 (ref 0.1–0.9)
MONOCYTES NFR BLD AUTO: 6.6 % (ref 5–12)
NEUTROPHILS # BLD AUTO: 7.89 10*3/MM3 (ref 1.7–7)
NEUTROPHILS NFR BLD AUTO: 59.5 % (ref 42.7–76)
NRBC BLD AUTO-RTO: 0 /100 WBC (ref 0–0.2)
PLATELET # BLD AUTO: 354 10*3/MM3 (ref 140–450)
PMV BLD AUTO: 11.1 FL (ref 6–12)
POTASSIUM BLD-SCNC: 3.8 MMOL/L (ref 3.5–5.2)
PROT SERPL-MCNC: 7.6 G/DL (ref 6–8.5)
RBC # BLD AUTO: 4.52 10*6/MM3 (ref 3.77–5.28)
SODIUM BLD-SCNC: 136 MMOL/L (ref 136–145)
TSH SERPL DL<=0.05 MIU/L-ACNC: 6.77 MIU/ML (ref 0.27–4.2)
VIT B12 BLD-MCNC: 528 PG/ML (ref 211–946)
WBC NRBC COR # BLD: 13.26 10*3/MM3 (ref 3.4–10.8)

## 2019-07-03 ENCOUNTER — TELEPHONE (OUTPATIENT)
Dept: FAMILY MEDICINE CLINIC | Facility: CLINIC | Age: 44
End: 2019-07-03

## 2019-07-03 DIAGNOSIS — R79.89 ABNORMAL TSH: Primary | ICD-10-CM

## 2019-07-03 RX ORDER — LEVOTHYROXINE SODIUM 0.05 MG/1
50 TABLET ORAL DAILY
Qty: 30 TABLET | Refills: 3 | Status: SHIPPED | OUTPATIENT
Start: 2019-07-03 | End: 2019-11-07 | Stop reason: SDUPTHER

## 2019-07-03 NOTE — TELEPHONE ENCOUNTER
Per MERARI Jones, Ms. Estrada has been called with recent Lab & Mammogram results & recommendations.  Continue current medications and follow-up as planned or sooner if any problems.      ----- Message from MERARI Niño sent at 7/3/2019  8:10 AM CDT -----  Can you let her know normal

## 2019-07-03 NOTE — PROGRESS NOTES
Per MERARI Jones, Ms. Estrada has been called with recent lab results & recommendations.  Continue current medications and follow-up as planned or sooner if any problems.    New Script sent to [facility], labs ordered for 1 month, patient does not have to be seen.

## 2019-07-03 NOTE — PROGRESS NOTES
Per MERARI Jones, Ms. Estrada has been called with recent Lab & Mammogram results & recommendations.  Continue current medications and follow-up as planned or sooner if any problems.

## 2019-07-03 NOTE — TELEPHONE ENCOUNTER
Per MERARI Jones, Ms. Estrada has been called with recent lab results & recommendations.  Continue current medications and follow-up as planned or sooner if any problems.    New Script sent to [facility], labs ordered for 1 month, patient does not have to be seen.        ----- Message from Brigid Gold MA sent at 7/3/2019  2:12 PM CDT -----      ----- Message -----  From: Luci Rodriguez APRN  Sent: 7/3/2019   8:08 AM  To: Brigid Gold MA    Can you let her know showed abnormal thyroid needs 50mcg synthroid to start with-recheck in 4 weeks -this could be the reason she is tired-needs order for tsh

## 2019-11-07 RX ORDER — LEVOTHYROXINE SODIUM 0.05 MG/1
50 TABLET ORAL DAILY
Qty: 30 TABLET | Refills: 3 | Status: SHIPPED | OUTPATIENT
Start: 2019-11-07 | End: 2020-03-02

## 2019-12-04 RX ORDER — ESCITALOPRAM OXALATE 5 MG/1
TABLET ORAL
Qty: 60 TABLET | Refills: 3 | Status: SHIPPED | OUTPATIENT
Start: 2019-12-04 | End: 2020-05-22

## 2020-03-02 RX ORDER — LEVOTHYROXINE SODIUM 0.05 MG/1
50 TABLET ORAL DAILY
Qty: 30 TABLET | Refills: 3 | Status: SHIPPED | OUTPATIENT
Start: 2020-03-02 | End: 2020-07-27

## 2020-05-22 ENCOUNTER — OFFICE VISIT (OUTPATIENT)
Dept: FAMILY MEDICINE CLINIC | Facility: CLINIC | Age: 45
End: 2020-05-22

## 2020-05-22 VITALS
SYSTOLIC BLOOD PRESSURE: 120 MMHG | HEIGHT: 63 IN | WEIGHT: 195 LBS | BODY MASS INDEX: 34.55 KG/M2 | DIASTOLIC BLOOD PRESSURE: 90 MMHG

## 2020-05-22 DIAGNOSIS — M25.512 ACUTE PAIN OF LEFT SHOULDER: Primary | ICD-10-CM

## 2020-05-22 DIAGNOSIS — M54.50 BILATERAL LOW BACK PAIN WITHOUT SCIATICA, UNSPECIFIED CHRONICITY: ICD-10-CM

## 2020-05-22 DIAGNOSIS — M54.2 NECK PAIN: ICD-10-CM

## 2020-05-22 PROCEDURE — 96372 THER/PROPH/DIAG INJ SC/IM: CPT | Performed by: NURSE PRACTITIONER

## 2020-05-22 PROCEDURE — 99214 OFFICE O/P EST MOD 30 MIN: CPT | Performed by: NURSE PRACTITIONER

## 2020-05-22 RX ORDER — KETOROLAC TROMETHAMINE 30 MG/ML
30 INJECTION, SOLUTION INTRAMUSCULAR; INTRAVENOUS ONCE
Status: COMPLETED | OUTPATIENT
Start: 2020-05-22 | End: 2020-05-22

## 2020-05-22 RX ORDER — TRIAMCINOLONE ACETONIDE 40 MG/ML
80 INJECTION, SUSPENSION INTRA-ARTICULAR; INTRAMUSCULAR ONCE
Status: COMPLETED | OUTPATIENT
Start: 2020-05-22 | End: 2020-05-22

## 2020-05-22 RX ORDER — BACLOFEN 10 MG/1
10 TABLET ORAL 3 TIMES DAILY
Qty: 30 TABLET | Refills: 5 | Status: SHIPPED | OUTPATIENT
Start: 2020-05-22 | End: 2022-04-25

## 2020-05-22 RX ORDER — MELOXICAM 7.5 MG/1
TABLET ORAL
Qty: 60 TABLET | Refills: 5 | Status: SHIPPED | OUTPATIENT
Start: 2020-05-22 | End: 2020-11-23

## 2020-05-22 RX ADMIN — KETOROLAC TROMETHAMINE 30 MG: 30 INJECTION, SOLUTION INTRAMUSCULAR; INTRAVENOUS at 10:21

## 2020-05-22 RX ADMIN — TRIAMCINOLONE ACETONIDE 80 MG: 40 INJECTION, SUSPENSION INTRA-ARTICULAR; INTRAMUSCULAR at 10:23

## 2020-05-22 NOTE — PROGRESS NOTES
Chief Complaint   Patient presents with   • Shoulder Pain     left side x 7 weeks now   • Neck Pain     Subjective   Karen Estrada is a 44 y.o. female.     Shoulder Injury    The left shoulder is affected. The incident occurred 3 to 6 hours ago. The injury mechanism was a fall. The quality of the pain is described as burning. The pain radiates to the back. The pain is at a severity of 3/10. The pain is moderate. Associated symptoms include numbness and tingling. Pertinent negatives include no chest pain or muscle weakness. The symptoms are aggravated by movement. She has tried acetaminophen and NSAIDs for the symptoms. The treatment provided moderate relief.   Neck Pain    This is a recurrent problem. The current episode started more than 1 month ago. The problem occurs intermittently. The problem has been gradually worsening. The pain is associated with nothing. The pain is present in the anterior neck. The quality of the pain is described as aching. The pain is at a severity of 5/10. The pain is severe. Stiffness is present all day. Associated symptoms include numbness and tingling. Pertinent negatives include no chest pain, fever, headaches, leg pain, pain with swallowing, paresis, photophobia, syncope, trouble swallowing, visual change, weakness or weight loss.   Back Pain   This is a recurrent problem. The current episode started more than 1 month ago. The problem occurs daily. The problem has been gradually worsening since onset. The pain is present in the lumbar spine. The quality of the pain is described as cramping, shooting and stabbing. The pain radiates to the left foot, right foot, right thigh, right knee, left thigh and left knee. The pain is at a severity of 6/10. The pain is severe. The pain is the same all the time. Stiffness is present all day. Associated symptoms include numbness, pelvic pain and tingling. Pertinent negatives include no abdominal pain, bladder incontinence, chest pain,  dysuria, fever, headaches, leg pain, paresis, paresthesias, perianal numbness, weakness or weight loss. (Local numbness mid back )        The following portions of the patient's history were reviewed and updated as appropriate: allergies, current medications, past social history and problem list.    Review of Systems   Constitutional: Positive for diaphoresis and fatigue. Negative for activity change, appetite change, chills, fever, unexpected weight change and weight loss.   HENT: Negative for dental problem, drooling, ear discharge, ear pain and trouble swallowing.             Eyes: Negative.  Negative for photophobia, pain, redness, itching and visual disturbance.   Respiratory: Negative.  Negative for apnea, choking, chest tightness, shortness of breath, wheezing and stridor.    Cardiovascular: Positive for leg swelling. Negative for chest pain, palpitations and syncope.        Distal leg edema-both feet    Gastrointestinal: Negative.  Negative for abdominal distention, abdominal pain, anal bleeding, blood in stool, constipation, diarrhea and rectal pain.   Endocrine: Negative.  Negative for cold intolerance, heat intolerance, polydipsia, polyphagia and polyuria.   Genitourinary: Positive for pelvic pain. Negative for bladder incontinence, decreased urine volume, difficulty urinating, dyspareunia, dysuria, enuresis, flank pain, frequency, genital sores, hematuria, menstrual problem, urgency, vaginal bleeding, vaginal discharge and vaginal pain.   Musculoskeletal: Positive for arthralgias, back pain, gait problem, joint swelling, myalgias and neck stiffness. Negative for neck pain.        Neck pain and pressure pain with radiation to left shoulder    Skin: Negative.  Negative for color change, pallor and wound.   Allergic/Immunologic: Negative.  Negative for environmental allergies, food allergies and immunocompromised state.   Neurological: Positive for tingling and numbness. Negative for dizziness, tremors,  "seizures, syncope, facial asymmetry, speech difficulty, weakness, light-headedness, headaches and paresthesias.   Hematological: Negative.  Negative for adenopathy. Does not bruise/bleed easily.   Psychiatric/Behavioral: Positive for agitation and sleep disturbance. Negative for behavioral problems, confusion, decreased concentration, dysphoric mood, hallucinations, self-injury and suicidal ideas. The patient is nervous/anxious. The patient is not hyperactive.    All other systems reviewed and are negative.      Objective   /90   Ht 160 cm (63\")   Wt 88.5 kg (195 lb)   BMI 34.54 kg/m²   Physical Exam   Constitutional: She appears well-developed.   HENT:   Head: Normocephalic.   Eyes: Pupils are equal, round, and reactive to light.   Neck: Normal range of motion.   Pulmonary/Chest: Effort normal.   Abdominal: Soft.   Musculoskeletal: She exhibits tenderness. She exhibits no edema or deformity.        Left shoulder: She exhibits decreased range of motion, tenderness, bony tenderness, pain and spasm.        Lumbar back: She exhibits tenderness, bony tenderness, pain and spasm.        Back:    Neurological: She displays normal reflexes. No cranial nerve deficit or sensory deficit. She exhibits normal muscle tone. Coordination normal.   Nursing note and vitals reviewed.      Assessment/Plan   Problem List Items Addressed This Visit        Nervous and Auditory    Bilateral low back pain without sciatica    Neck pain    Relevant Medications    triamcinolone acetonide (KENALOG-40) injection 80 mg (Completed)    ketorolac (TORADOL) injection 30 mg (Completed)    Other Relevant Orders    Ambulatory Referral to Physical Therapy (Completed)    XR spine cervical 2 or 3 vw (Completed)    Acute pain of left shoulder - Primary    Relevant Medications    triamcinolone acetonide (KENALOG-40) injection 80 mg (Completed)    ketorolac (TORADOL) injection 30 mg (Completed)    Other Relevant Orders    Ambulatory Referral to " Physical Therapy (Completed)    XR spine cervical 2 or 3 vw (Completed)           New Medications Ordered This Visit   Medications   • meloxicam (Mobic) 7.5 MG tablet     Si-2 daily     Dispense:  60 tablet     Refill:  5   • baclofen (LIORESAL) 10 MG tablet     Sig: Take 1 tablet by mouth 3 (Three) Times a Day.     Dispense:  30 tablet     Refill:  5   • triamcinolone acetonide (KENALOG-40) injection 80 mg   • ketorolac (TORADOL) injection 30 mg       It's not just what you eat, but when you eat  Eat breakfast, and eat smaller meals throughout the day. A healthy breakfast can jumpstart your metabolism, while eating small, healthy meals (rather than the standard three large meals) keeps your energy up.   Avoid eating at night. Try to eat dinner earlier and fast for 14-16 hours until breakfast the next morning. Studies suggest that eating only when you’re most active and giving your digestive system a long break each day may help to regulate weight.     Plan, toradol, kenalog, pt referral, xray today, baclofen and mobic as directed, see back in 4-5 weeks after therapy-can cancel appointment if symptoms have resolved.   XR spine cervical 2 or 3 vw   Order: [id number]   Status:  Final result   Visible to patient:  No (Not Released) Dx:  Acute pain of left shoulder; Neck pain   Details     Reading Physician Reading Date Result Priority   Michael Ch MD 2020       Narrative & Impression     Procedure: Cervical spine 3 views     Reason for exam: Acute pain left shoulder     FINDINGS: Comparison exam dated 2011. Cervical spine  vertebral body heights and alignment are normal. There is no  evidence of fracture or dislocation. Intervertebral disc spaces  are intact.     IMPRESSION:  Negative cervical spine.     Electronically signed by:  Pranav Ch MD  2020 11:15 AM CDT  Workstation             meds as directed, diet discussed, follow up if worsen     History of abnormal mri lumbar see hx-see  back as directed after pt

## 2020-06-22 RX ORDER — LOSARTAN POTASSIUM AND HYDROCHLOROTHIAZIDE 25; 100 MG/1; MG/1
1 TABLET ORAL DAILY
Qty: 30 TABLET | Refills: 11 | Status: SHIPPED | OUTPATIENT
Start: 2020-06-22 | End: 2021-07-09 | Stop reason: ALTCHOICE

## 2020-07-27 RX ORDER — LEVOTHYROXINE SODIUM 0.05 MG/1
50 TABLET ORAL DAILY
Qty: 30 TABLET | Refills: 5 | Status: SHIPPED | OUTPATIENT
Start: 2020-07-27 | End: 2021-01-19 | Stop reason: SDUPTHER

## 2020-07-27 RX ORDER — ESCITALOPRAM OXALATE 5 MG/1
TABLET ORAL
Qty: 60 TABLET | Refills: 5 | Status: SHIPPED | OUTPATIENT
Start: 2020-07-27 | End: 2020-08-17

## 2020-07-27 RX ORDER — IBUPROFEN 800 MG/1
TABLET ORAL
Qty: 90 TABLET | Refills: 11 | Status: SHIPPED | OUTPATIENT
Start: 2020-07-27 | End: 2021-11-08

## 2020-08-17 ENCOUNTER — OFFICE VISIT (OUTPATIENT)
Dept: FAMILY MEDICINE CLINIC | Facility: CLINIC | Age: 45
End: 2020-08-17

## 2020-08-17 VITALS
WEIGHT: 196 LBS | HEART RATE: 65 BPM | DIASTOLIC BLOOD PRESSURE: 90 MMHG | OXYGEN SATURATION: 98 % | BODY MASS INDEX: 34.73 KG/M2 | HEIGHT: 63 IN | SYSTOLIC BLOOD PRESSURE: 150 MMHG

## 2020-08-17 DIAGNOSIS — F41.9 ANXIETY: ICD-10-CM

## 2020-08-17 DIAGNOSIS — N81.4 CYSTOCELE WITH PROLAPSE: Primary | ICD-10-CM

## 2020-08-17 PROCEDURE — 99214 OFFICE O/P EST MOD 30 MIN: CPT | Performed by: NURSE PRACTITIONER

## 2020-08-17 RX ORDER — BUSPIRONE HYDROCHLORIDE 10 MG/1
TABLET ORAL
Qty: 90 TABLET | Refills: 11 | Status: SHIPPED | OUTPATIENT
Start: 2020-08-17

## 2020-08-17 RX ORDER — ESCITALOPRAM OXALATE 10 MG/1
10 TABLET ORAL DAILY
Qty: 30 TABLET | Refills: 11 | Status: SHIPPED | OUTPATIENT
Start: 2020-08-17 | End: 2021-05-31 | Stop reason: SDUPTHER

## 2020-09-22 ENCOUNTER — TELEPHONE (OUTPATIENT)
Dept: FAMILY MEDICINE CLINIC | Facility: CLINIC | Age: 45
End: 2020-09-22

## 2020-09-22 ENCOUNTER — OFFICE VISIT (OUTPATIENT)
Dept: OBSTETRICS AND GYNECOLOGY | Facility: CLINIC | Age: 45
End: 2020-09-22

## 2020-09-22 VITALS — WEIGHT: 198.6 LBS | BODY MASS INDEX: 35.18 KG/M2 | SYSTOLIC BLOOD PRESSURE: 128 MMHG | DIASTOLIC BLOOD PRESSURE: 76 MMHG

## 2020-09-22 DIAGNOSIS — N94.10 DYSPAREUNIA IN FEMALE: ICD-10-CM

## 2020-09-22 DIAGNOSIS — N81.9 MIXED URINARY INCONTINENCE DUE TO FEMALE GENITAL PROLAPSE: ICD-10-CM

## 2020-09-22 DIAGNOSIS — R10.2 PELVIC PRESSURE IN FEMALE: ICD-10-CM

## 2020-09-22 DIAGNOSIS — N81.10 BADEN-WALKER GRADE 4 CYSTOCELE: Primary | ICD-10-CM

## 2020-09-22 DIAGNOSIS — N39.46 MIXED URINARY INCONTINENCE DUE TO FEMALE GENITAL PROLAPSE: ICD-10-CM

## 2020-09-22 PROCEDURE — 99214 OFFICE O/P EST MOD 30 MIN: CPT | Performed by: NURSE PRACTITIONER

## 2020-09-22 NOTE — TELEPHONE ENCOUNTER
PATIENT IS REQUESTING A CALL BACK TO GO OVER HER VISIT AT DR HALE OFFICE SHE STATES THE DR HAD AN EMERGENCY SO SHE HAD TO SEE A DIFFERENT PROVIDER AND WOULD LIKE TO GO OVER HER VISIT WITH MIGUELITO CLEMENTE CONTACT NUMBER   [phone] (X)

## 2020-09-22 NOTE — PROGRESS NOTES
Subjective   Karen Estrada is a 45 y.o. referred by PCP for cystocele.    States that she has had her bladder hanging out of her vagina everyday at the end of her work shift for approximately 1 year. For the past 6 months she has also been dealing with bladder leakage.    H/O TVH with ovarian preservation in  for menstrual issues.  Last pap- 05 NIL    Gynecologic Exam  The patient's pertinent negatives include no genital itching, genital lesions, genital odor, genital rash, pelvic pain, vaginal bleeding or vaginal discharge. Primary symptoms comment: prolapse. This is a chronic problem. The current episode started more than 1 year ago. The problem occurs daily. The problem has been gradually worsening. The pain is mild. Associated symptoms include constipation, frequency and urgency. Pertinent negatives include no abdominal pain, back pain, chills, diarrhea, dysuria, fever or painful intercourse. The symptoms are aggravated by heavy lifting (standing). Treatments tried: Impressa OTC pessary. The treatment provided no relief (Caused pelvic cramping). She is sexually active. No, her partner does not have an STD. She uses hysterectomy for contraception. Her past medical history is significant for a  section, a gynecological surgery, menorrhagia, metrorrhagia and miscarriage. There is no history of an abdominal surgery, an ectopic pregnancy, endometriosis, herpes simplex, ovarian cysts, perineal abscess, PID, an STD, a terminated pregnancy or vaginosis.       The following portions of the patient's history were reviewed and updated as appropriate: allergies, current medications, past family history, past medical history, past social history, past surgical history and problem list.    Review of Systems   Constitutional: Negative for activity change, appetite change, chills, diaphoresis, fatigue, fever, unexpected weight gain and unexpected weight loss.   Gastrointestinal: Positive for  constipation. Negative for abdominal pain and diarrhea.   Genitourinary: Positive for dyspareunia, frequency, menorrhagia, pelvic pressure, urgency and urinary incontinence. Negative for decreased libido, decreased urine volume, difficulty urinating, dysuria, pelvic pain, vaginal bleeding, vaginal discharge and vaginal pain.   Musculoskeletal: Negative for back pain.   Psychiatric/Behavioral: Positive for dysphoric mood, sleep disturbance and stress. Negative for agitation, self-injury and suicidal ideas. The patient is nervous/anxious.        Objective   Physical Exam  Vitals signs and nursing note reviewed. Exam conducted with a chaperone present.   Constitutional:       General: She is awake.      Appearance: Normal appearance. She is well-developed and well-groomed. She is obese. She is not ill-appearing, toxic-appearing or diaphoretic.   Abdominal:      Hernia: There is no hernia in the left inguinal area or right inguinal area.   Genitourinary:     General: Normal vulva.      Kush stage (genital): 5.      Labia:         Right: No rash, tenderness, lesion or injury.         Left: No rash, tenderness, lesion or injury.       Urethra: No prolapse, urethral pain, urethral swelling or urethral lesion.      Vagina: Normal.      Comments: Cervix and uterus absent. Adnexa not palpable. Unable to hold K[place]. Grade 4 cystocele with valsalva. Grade 1 rectocele with valsalva.  Lymphadenopathy:      Lower Body: No right inguinal adenopathy. No left inguinal adenopathy.   Neurological:      Mental Status: She is alert.   Psychiatric:         Attention and Perception: Attention and perception normal.         Mood and Affect: Mood and affect normal.         Speech: Speech normal.         Behavior: Behavior normal. Behavior is cooperative.           Assessment/Plan   Karen was seen today for gynecologic exam.    Diagnoses and all orders for this visit:    [place] grade 4 cystocele  -     Ambulatory Referral to  Physical Therapy Evaluate and treat, Pelvic Floor  -     Ambulatory Referral to Gynecologic Urology    Dyspareunia in female    Pelvic pressure in female    Mixed urinary incontinence due to female genital prolapse    Pt declined pessary therapy but will start seeing PFPT while she is waiting to see the  In Urogynecology; she is interested in surgical options at this time.

## 2020-09-23 ENCOUNTER — TELEPHONE (OUTPATIENT)
Dept: FAMILY MEDICINE CLINIC | Facility: CLINIC | Age: 45
End: 2020-09-23

## 2020-09-23 NOTE — TELEPHONE ENCOUNTER
PT CALLED STATING SHE HAS BEEN TRYING TO CONTACT NITA ABOUT PAPER WORK FOR [facility]. SHE IS NEEDING A CALL AS SOON AS YOU HAVE A CHANCE. [phone].     THANK YOU

## 2020-09-28 ENCOUNTER — TELEPHONE (OUTPATIENT)
Dept: OBSTETRICS AND GYNECOLOGY | Facility: CLINIC | Age: 45
End: 2020-09-28

## 2020-09-28 ENCOUNTER — TELEPHONE (OUTPATIENT)
Dept: FAMILY MEDICINE CLINIC | Facility: CLINIC | Age: 45
End: 2020-09-28

## 2020-09-28 NOTE — TELEPHONE ENCOUNTER
Alma Delia Verma from [facility] has some questions about a surgery she states she had but I do not see anything that documents that, granted she may have had it some where else.     She would like to talk to you about her paperwork     Please call her back.

## 2020-09-28 NOTE — TELEPHONE ENCOUNTER
Alma Delia with [facility] called with some questions regarding patient.    Please return her call [phone]

## 2020-09-29 ENCOUNTER — TELEPHONE (OUTPATIENT)
Dept: FAMILY MEDICINE CLINIC | Facility: CLINIC | Age: 45
End: 2020-09-29

## 2020-09-29 NOTE — TELEPHONE ENCOUNTER
Caller: Karen Estrada    Relationship to patient: Self    Best call back number: [phone]    Patient is needing: TO SPEAK WITH GEOVANNY ABOUT HER SHORT TERM DISABILITY.

## 2020-10-01 ENCOUNTER — HOSPITAL ENCOUNTER (OUTPATIENT)
Dept: PHYSICAL THERAPY | Facility: HOSPITAL | Age: 45
Setting detail: THERAPIES SERIES
Discharge: HOME | End: 2020-10-01

## 2020-10-01 DIAGNOSIS — N39.46 MIXED URINARY INCONTINENCE DUE TO FEMALE GENITAL PROLAPSE: ICD-10-CM

## 2020-10-01 DIAGNOSIS — N81.4 CYSTOCELE WITH PROLAPSE: ICD-10-CM

## 2020-10-01 DIAGNOSIS — N81.89 PELVIC FLOOR WEAKNESS IN FEMALE: Primary | ICD-10-CM

## 2020-10-01 DIAGNOSIS — N39.3 STRESS INCONTINENCE OF URINE: ICD-10-CM

## 2020-10-01 DIAGNOSIS — N81.9 MIXED URINARY INCONTINENCE DUE TO FEMALE GENITAL PROLAPSE: ICD-10-CM

## 2020-10-01 PROCEDURE — 97162 PT EVAL MOD COMPLEX 30 MIN: CPT | Performed by: PHYSICAL THERAPIST

## 2020-10-01 PROCEDURE — 97535 SELF CARE MNGMENT TRAINING: CPT | Performed by: PHYSICAL THERAPIST

## 2020-10-01 NOTE — THERAPY EVALUATION
Outpatient Physical Therapy Pelvic Health Initial Evaluation  [facility]     Patient Name: Karen Estrada  : 1975  MRN: [id number]  Today's Date: 10/1/2020        Visit Date: 10/01/2020   Visit number:   Recheck: 10/30/20  Insurance: [employer] 25 visits per year      Patient Active Problem List   Diagnosis   • Bilateral low back pain without sciatica   • Nerve pain   • Swelling of left elbow   • Numbness and tingling of right leg   • Urinary incontinence   • Left knee injury, initial encounter   • Fall down stairs   • Arthralgia   • Internal derangement of left knee   • Sprain of medial collateral ligament of left knee   • Knee effusion, left   • Neck pain   • Parotid gland pain   • Anxiety   • Malaise and fatigue   • Hypertension   • Encounter for screening mammogram for malignant neoplasm of breast   • Acute pain of left shoulder   • Cystocele with prolapse   • Mixed urinary incontinence due to female genital prolapse   • [place] grade 4 cystocele        Past Medical History:   Diagnosis Date   • Anxiety    • Backache     radiation to legs-abnormal MRI   • Benign hypertension    • Breast cyst    • Breast lump     left breast   • Chest pain    • Cough    • Degenerative joint disease involving multiple joints    • Depression    • Epigastric pain    • Essential hypertension    • Fatigue    • Functional heart murmur    • Gestational diabetes mellitus    • History of transfusion    • Hypertensive disorder    • Intrinsic asthma with status asthmaticus    • Low back pain    • Lumbar disc lesion    • Lymphadenopathy    • Malaise and fatigue    • Migraine    • Neck pain     throat pain   • Non-IgE mediated allergic asthma    • Noncompliance with treatment    • Overweight    • Plantar fascial fibromatosis    • Plantar fasciitis of right foot    • Soft tissue swelling     right lateral chest wall   • Surgery follow-up    • Upper respiratory infection    • Wheezing         Past Surgical  History:   Procedure Laterality Date   • BREAST BIOPSY  1998    left mass, fibrosis, mild   •  SECTION     • COLONOSCOPY  2005    normal colonoscopic exam.  Irritable bowel   • DILATATION AND CURETTAGE  1998    incomplete    • FOOT SURGERY  2015    partial plantar fasciectomy, right foot   • HYSTERECTOMY  2007    menorrhagia; still has both ovaries   • INJECTION OF MEDICATION  2014    dexamethasone (1)   • INJECTION OF MEDICATION  2014    inj Tend-Sheath Ligament  (3)   • INJECTION OF MEDICATION  2014    Kenalog (4)   • INJECTION OF MEDICATION  2011    Toradol (1)   • PAP SMEAR  10/15/1996    normal   • TUBAL ABDOMINAL LIGATION     • VAGINAL DELIVERY      x3     Current Outpatient Medications on File Prior to Encounter   Medication Sig Dispense Refill   • albuterol sulfate HFA (VENTOLIN HFA) 108 (90 Base) MCG/ACT inhaler Inhale 2 puffs Every 4 (Four) Hours As Needed for Wheezing. 1 inhaler 0   • baclofen (LIORESAL) 10 MG tablet Take 1 tablet by mouth 3 (Three) Times a Day. 30 tablet 5   • busPIRone (BUSPAR) 10 MG tablet Tid prn 90 tablet 11   • escitalopram (Lexapro) 10 MG tablet Take 1 tablet by mouth Daily. 30 tablet 11   • ibuprofen (ADVIL,MOTRIN) 800 MG tablet TAKE 1 TABLET BY MOUTH EVERY 8 HOURS AS NEEDED FOR PAIN 90 tablet 11   • levothyroxine (SYNTHROID, LEVOTHROID) 50 MCG tablet TAKE 1 TABLET BY MOUTH DAILY 30 tablet 5   • losartan-hydrochlorothiazide (HYZAAR) 100-25 MG per tablet TAKE 1 TABLET BY MOUTH DAILY 30 tablet 11   • meloxicam (Mobic) 7.5 MG tablet 1-2 daily 60 tablet 5     No current facility-administered medications on file prior to encounter.         Allergies   Allergen Reactions   • Promethazine Itching   • Adhesive Tape Swelling and Rash         Visit Dx:    ICD-10-CM ICD-9-CM   1. Pelvic floor weakness in female  N81.89 618.89   2. Mixed urinary incontinence due to female genital prolapse  N39.46 788.33    N81.9 618.9    3. Cystocele with prolapse  N81.4 618.4   4. Stress incontinence of urine  N39.3 [id number]           Pelvic Health     Row Name 10/01/20 1600             Subjective Comments    Subjective Comments  Saw Marybeth in August but noted something looked different in vagina 3 months prior.  Has extreme urgency with little flow of urination noted.  Urine leakage was the first sign then progressed to prolapse.  Mena in Sept at grade 4 bladder drop.  Vaginal canal is opened at all times and goes outside body when prolonged standing.  prolapse stays very dry and irritable.  Pain down R side that often comes down her legs.  No finger splinting for urination or defecation.  Impressiva used intermittently.  Seems to be reduce a little at night but never really closes up.  Frequency of void 20-30 times per day.  Scheduled to see Stephanie Oct 27th.  Doesn't feel this will get better without surgey.  I feel that it has worsened over the last month of time.  [employer] employee working 10 hrs straight with repeated pushing and pulling. FMLA used til first of year until follow up with Stephanie.  Notes a varicose vein also present which comes/goes in size.  Post hysterectomy () she noticed things didn't look right but noted closure to opening.  Urination is more like a spray vs stream.  Defecation is difficult because of strain.  Frequency of stool 1-2 per week. [place] stool scale 1 which is consistent with her for some time.  Intake: water/gatorade, tea, coffee. Fruits and vegetables. Has used Miralax, Colace used intermittently about every couple of months.    -SW         Pregnancy Questions    Number of Pregnancies  5  -SW      Number of Miscarriages  1  -SW      Has the patient had an ?  No  -SW      Number of Children  4 30, 19 and 22 yo  -SW      Type of Previous Deliveries  Vaginal;  -SW         Pain Assessment    Pain Assessment  0-10  -SW      Pain Score  3  -SW      Post Pain Score  3  -SW      Pain  Location  Vagina  -SW         Pelvic Floor Muscle    Patient/Parent/Guardian Consented to Internal Pelvic Floor Exam  Yes  -SW      Strength (Right)  2: Squeeze no lift;3: Squeeze with/without lift  -SW      Strength (Left)  2: Squeeze no lift;3: Squeeze with/without lift  -SW      Symmetry of Sustained Maximal Contraction  Symmetrical  -SW      Endurance (Ability to Hold Maximal Contraction)  3 sec  -SW      # of Reps of Maximal Contractions while Maintaining Endurance and Strength  3 sets  -SW      Fast Contraction (# of 1 sec contractions performed)  6  -SW      Internal Pelvic Floor Comments  no tenderness to tissues of vaginal cuff.  ant wall laxity noted grade III advanced to stage IV.  MMT 2/5 to 3/5.    -SW      External Pelvic Floor Comments  perineal body descended position.  slight gapping noted at introitus.  No hypopigmentation regions noted. No irritated tissued observed.   -SW         Observations    Perineal Observation Performed?  Yes  -SW      Posture Observations  alert and oriented.  good historian of medical events leading up to this point.   -SW         Observation of Contraction in Perineum    Anal [place]  Present  -SW      Perineal Body Lift  Present subtle muscle lift noted.   -SW         Cough    Bulge  Yes  -SW         Prolapse (Pop-Q)    Cystocele  Stage III;Stage IV  -SW      Rectocele  Stage I  -SW      Prolapse Comments  ant wall laxity noted; progresses with valsalva manuever to stage IV.  -SW         SEMG Evaluation    SEMG Evaluation Comments  attempted assessment but due to equipment malfunction, unable to obtain thorough readings. Initial tone appeared elevated at 3.4 mv of rest.  Upon start of contraction, artifact identified and could not be corrected.  will resume testing at next date.   -SW         Education Provided On:    Education Points  Minimize Irritation to Vulvar Tissue with Handout;HEP;Behavioral modifications coconut oil for lubrication to POP  -SW      Method of  Delivery  Verbal;Demonstration;Written  -SW      Education Provided To  Patient  -      Level of Understanding  Teach back education performed;Verbalized;Demonstrated  -SW      HEP Comments  pelvic rest position x 10 min per day; isolated PF contractions with exhalation breath; bladder diary completion   -        User Key  (r) = Recorded By, (t) = Taken By, (c) = Cosigned By    Initials Name Provider Type    Linda Perez, PT DPT Physical Therapist                       PT Assessment/Plan     Row Name 10/01/20 1700          PT Assessment    Functional Limitations  Limitation in home management;Limitations in community activities;Performance in leisure activities;Performance in self-care ADL;Performance in work activities  -SW     Impairments  Impaired muscle endurance;Impaired muscle power;Muscle strength;Pain;Poor body mechanics;Posture;Other (comment) UI and POP; constipation  -SW     Assessment Comments  Patient is a 44 yo female presenting to clinic with complaints of pelvic muscle instability with secondary ant wall laxity with grade III/IV POP.  She would benefit from skilled therapy intervention to address function, improve bladder control, PF strength and stability while improving overall QOL.   -SW     Rehab Potential  Good  -SW     Patient/caregiver participated in establishment of treatment plan and goals  Yes  -SW     Patient would benefit from skilled therapy intervention  Yes  -SW        PT Plan    PT Frequency  1x/week  -SW     Predicted Duration of Therapy Intervention (OT)  12-15 visits  -SW     PT Plan Comments  bladder/samantha dairy analysis with re-education and management; uptraining to PF, dietary recommendations, stimulation as needed, body mechanics, bladder re-education. urge suppression training  -       User Key  (r) = Recorded By, (t) = Taken By, (c) = Cosigned By    Initials Name Provider Type    Linda Perez, PT DPT Physical Therapist            OP Exercises      Row Name 10/01/20 1600             Subjective Comments    Subjective Comments  Saw Marybeth in August but noted something looked different in vagina 3 months prior.  Has extreme urgency with little flow of urination noted.  Urine leakage was the first sign then progressed to prolapse.  Mena in Sept at grade 4 bladder drop.  Vaginal canal is opened at all times and goes outside body when prolonged standing.  prolapse stays very dry and irritable.  Pain down R side that often comes down her legs.  No finger splinting for urination or defecation.  Impressiva used intermittently.  Seems to be reduce a little at night but never really closes up.  Frequency of void 20-30 times per day.  Scheduled to see Stephanie Oct 27th.  Doesn't feel this will get better without surgey.  I feel that it has worsened over the last month of time.  [employer] employee working 10 hrs straight with repeated pushing and pulling. FMLA used til first of year until follow up with Stephanie.  Notes a varicose vein also present which comes/goes in size.  Post hysterectomy (07) she noticed things didn't look right but noted closure to opening.  Urination is more like a spray vs stream.  Defecation is difficult because of strain.  Frequency of stool 1-2 per week. [place] stool scale 1 which is consistent with her for some time.  Intake: water/gatorade, tea, coffee. Fruits and vegetables. Has used Miralax, Colace used intermittently about every couple of months.    -SW         Exercise 1    Exercise Name 1  pelvic rest position   -SW      Additional Comments  10 minute per day  -SW         Exercise 2    Exercise Name 2  bladder diary and bowel   -SW      Additional Comments  track x 3 days prior to next appt and return at next visit for analysis.   -SW         Exercise 3    Exercise Name 3  isolated PF contarction with exhalation of breath.  -SW      Reps 3  10  -SW        User Key  (r) = Recorded By, (t) = Taken By, (c) = Cosigned By    Initials  Name Provider Type    Linda Perez, PT DPT Physical Therapist                      PT OP Goals     Row Name 10/01/20 1738 10/01/20 1700       PT Short Term Goals    STG Date to Achieve  --  10/30/20  -[facility] 1  --  patient to be independent with diary completion x 3 days   -[facility] 1 Progress  --  New  -[facility] 2  --  patient to comply with pelvic rest position x 10 min daily to assist with approximation of POP  -[facility] 2 Progress  --  New  -[facility] 3  --  patient to verbalize hydration goals and dec irritants as to provide best environment for POP and avoid UI.   -[facility] 3 Progress  --  New  -[facility] 4  --  patient to demo toileting position to improve elimination and evacuation procedures without strain .  -[facility] 4 Progress  --  New  -[facility] 5  --  patient to demonstrate normal resting tone to PF in supine position of 2.9mv or less with consistency x 3 minutes.   -[facility] 5 Progress  --  New  -[facility] 6  --  patient to show isolated PF recruitment of 10 reps with full return to baseline without delay  -[facility] 6 Progress  --  New  -       Long Term Goals    LTG Date to Achieve  --  01/01/21  -    LTG 1  --  void frequency of every 3-4 hours during the day to improve urinary urgency and control   -    LTG 1 Progress  --  New  -    LTG 2  --  patient to show improved coordination of PF recruitment in seated and/or standing postures to allow QF activation with full return to baseline x 10 ea showing amplitude rise of at least 4+ mv.   -    LTG 2 Progress  --  New  -    LTG 3  --  patient to report with improved endurance to PF in seated and/or standing postures of 5-10 sec with good stability and without substitution patterns.   -    LTG 3 Progress  --  New  -    LTG 4  --  UI to be reduced by 70% overall with reduction to pantyliner vs bladder control pad   -    LTG 4 Progress  --  New  -    LTG 5  --  patient to demonstrate proper body mechanics with daily  activities as to promote stabilized PF position without strain to lift.   -SW    LTG 5 Progress  --  New  -       Time Calculation    PT Goal Re-Cert Due Date  10/30/20  -SW  10/30/20  -      User Key  (r) = Recorded By, (t) = Taken By, (c) = Cosigned By    Initials Name Provider Type    Linda Perez, PT DPT Physical Therapist          Therapy Education  Given: HEP, Symptoms/condition management  Program: New  How Provided: Verbal, Demonstration, Written  Provided to: Patient  Level of Understanding: Teach back education performed, Verbalized, Demonstrated               Time Calculation:   Start Time: 1600  Stop Time: 1715  Time Calculation (min): 75 min  Therapy Charges for Today     Code Description Service Date Service Provider Modifiers Qty    08898992143 HC PT EVAL MOD COMPLEXITY 4 10/1/2020 Linda Leahy, PT DPT GP 1    [id number]  PT SELF CARE/MGMT/TRAIN EA 15 MIN 10/1/2020 Linda Leahy, PT DPT GP 1                  Linda Leahy PT DPT  10/1/2020

## 2020-10-19 ENCOUNTER — HOSPITAL ENCOUNTER (OUTPATIENT)
Dept: PHYSICAL THERAPY | Facility: HOSPITAL | Age: 45
Setting detail: THERAPIES SERIES
Discharge: HOME | End: 2020-10-19

## 2020-10-19 DIAGNOSIS — N81.89 PELVIC FLOOR WEAKNESS IN FEMALE: Primary | ICD-10-CM

## 2020-10-19 DIAGNOSIS — N39.3 STRESS INCONTINENCE OF URINE: ICD-10-CM

## 2020-10-19 DIAGNOSIS — N81.4 CYSTOCELE WITH PROLAPSE: ICD-10-CM

## 2020-10-19 PROCEDURE — 97112 NEUROMUSCULAR REEDUCATION: CPT | Performed by: PHYSICAL THERAPIST

## 2020-10-19 NOTE — THERAPY TREATMENT NOTE
Outpatient Physical Therapy Pelvic Health Treatment Note  [facility]     Patient Name: Karen Estrada  : 1975  MRN: [id number]  Today's Date: 10/19/2020        Visit Date: 10/19/2020  Visit Number:   Recheck: 10/30/20  Insurance: [facility] 25 visit/year    Visit Dx:    ICD-10-CM ICD-9-CM   1. Pelvic floor weakness in female  N81.89 618.89   2. Cystocele with prolapse  N81.4 618.4   3. Stress incontinence of urine  N39.3 [id number]       Patient Active Problem List   Diagnosis   • Bilateral low back pain without sciatica   • Nerve pain   • Swelling of left elbow   • Numbness and tingling of right leg   • Urinary incontinence   • Left knee injury, initial encounter   • Fall down stairs   • Arthralgia   • Internal derangement of left knee   • Sprain of medial collateral ligament of left knee   • Knee effusion, left   • Neck pain   • Parotid gland pain   • Anxiety   • Malaise and fatigue   • Hypertension   • Encounter for screening mammogram for malignant neoplasm of breast   • Acute pain of left shoulder   • Cystocele with prolapse   • Mixed urinary incontinence due to female genital prolapse   • Ventura-Walker grade 4 cystocele        Pelvic Health     Row Name 10/19/20 1000             Subjective Comments    Subjective Comments  Patient reports taking water pill with other medications.  Try to take in morning but eliminates to later part of day due to frequent urination.  Bladder seems to be about the same.  Seems she has to bend forward while urinating.  Did the pelvic rest position this week and inversion table over the week.  Has tried to push bladder back up in there when necessary.  Better until she has been moving around some.  Goes to [facility] on Tuesday for consult.  She is hoping he will just do surgery and be over it. She is tired of dealing with it every day. I am depressed by the presence of prolapse.    -SW         Pregnancy Questions    Number of Pregnancies  5  -SW      Number of  Miscarriages  1  -SW      Has the patient had an ?  No  -SW      Number of Children  4  -SW      Type of Previous Deliveries  Vaginal;  -SW         Pain Assessment    Pain Assessment  0-10  -SW      Pain Score  3  -SW      Post Pain Score  3  -SW      Pain Location  Vagina  -SW         Pelvic Floor Muscle    Internal Pelvic Floor Comments  deferred due to behavioral health training  -SW         Observations    Posture Observations  alert and oriented.  Patient brought to therapy her bladder/bowel diary for analysis.  Completed and recommendations listed in chart note with details.   -SW         SEMG Evaluation    SEMG Evaluation Comments  deferred  -SW         Education Provided On:    Education Points  Information on dietary irritants to bladder/bowels;Minimize Irritation to Vulvar Tissue with Handout;Behavioral modifications  -SW      Method of Delivery  Verbal;Demonstration;Written  -SW      Education Provided To  Patient  -SW      Level of Understanding  Teach back education performed;Verbalized;Demonstrated  -SW      HEP Comments  cont pelvic rest position. Begin behavioral training.  1.5 hour voids, dec irritants, inc hydration (good 2/3 total of estimated 75 oz per day)  -SW        User Key  (r) = Recorded By, (t) = Taken By, (c) = Cosigned By    Initials Name Provider Type    Linda Perez, PT DPT Physical Therapist        Bladder and/or Bowel Diary was reviewed this date and education completed as follows based on objective information given in diary.    Normal bladder and bowel anatomy was introduced as to better understand their function.  The normal voiding range was given with average of 6-8 times during a 24 hour period, with average of nocturia up to 1 per night post menopausal.  Urine stream was discussed as to not force, push, or strain to initiate or empty the flow of urine.  Proper toileting was reviewed as to improve overall evacuation for bladder and bowel systems.   Patient demonstrated this technique via verbal and demonstrative techniques shown by the therapist.  Three primary functions of the pelvic floor were discussed to include 1) sexual appreciation, 2) support of internal structures, and 3) sphincteric control.  Additionally, normal bladder and bowel function was discussed and patient's values, as per his/her diary, were presented and compared to that of normal function.    Patient's diary reveals 10-11 voids total per day with 2-3 average episodes of nocturia per night.  Patient void schedule varied with increments of 1-3 hour increments. Patient presented with 2-3 episodes of leakage per day.    Patient's level of urgency ranged from 2-3 in terms of strength with 1=min urge, 2=moderate urge and 3=strong urge.  Patient received education on the urge signal that one feels as the bladder wall stretches to fill with urine.  Three stage scale given with descriptor of the stages identified as stated above.  Urge suppression techniques to help control the urge and behaviorally manage urination and/or bowel schedule were taught during this time.    Good fluid intake was discussed as to aid in the promotion of good bladder health.  Hydration formula using body weight guide was used to calculate potential need for full hydration.  Per the bladder review, the patient consumed 32 total fluid oz per day with majority oz consisting of irritating fluid.  Per the formula, a hydration goal of 75 oz per day with 25 or less oz irritant fluids based upon body weight formula.    Dietary intake was discussed this visit as to how it too can affect the overall bladder health.  Handout was issued, as well as thorough discussion given as to irritants to bladder and how to categorize those irritants to improve overall bladder health. Suggestions were given based on response of patient's dietary intake per diary.               PT Assessment/Plan     Row Name 10/19/20 1000          PT Assessment     Functional Limitations  Limitation in home management;Limitations in community activities;Performance in leisure activities;Performance in self-care ADL;Performance in work activities  -     Impairments  Impaired muscle endurance;Impaired muscle power;Muscle strength;Pain;Poor body mechanics;Posture;Other (comment) UI and POP; constipation  -     Assessment Comments  Patient's diary showed dehydration and overall frequency/urgency of bladder.  She received formal education re: bladder health, dietary irritants and neuromuscular re-ed to assist with overall function to PF.  She verbalized understanding and agreed to make changes behaviorally to improve overall health to PF.  Patient is growing weary with bladder ailments and is ready for surgery, but she is hopeful that these changes may help her with general improvement to PF in general and improve function post surgery if determined necessary at specialty follow up next week. STG 1 met with progression toward other goals.   -SW     Rehab Potential  Good  -SW     Patient/caregiver participated in establishment of treatment plan and goals  Yes  -SW     Patient would benefit from skilled therapy intervention  Yes  -SW        PT Plan    PT Frequency  1x/week  -SW     PT Plan Comments  begin uptraining to PF.  Stimulation as necesary. Revisit behavioral training and changes with improvements/obstacles as needed.    -       User Key  (r) = Recorded By, (t) = Taken By, (c) = Cosigned By    Initials Name Provider Type    Linda Perez, PT DPT Physical Therapist            OP Exercises     Row Name 10/19/20 1000             Subjective Comments    Subjective Comments  Patient reports taking water pill with other medications.  Try to take in morning but eliminates to later part of day due to frequent urination.  Bladder seems to be about the same.  Seems she has to bend forward while urinating.  Did the pelvic rest position this week and inversion table over  the week.  Has tried to push bladder back up in there when necessary.  Better until she has been moving around some.  Goes to MD on Tuesday for consult.  She is hoping he will just do surgery and be over it. She is tired of dealing with it every day. I am depressed by the presence of prolapse.    -         Exercise 1    Exercise Name 1  bladder diary analysis  -      Additional Comments  see chart note for recommendations  -         Exercise 2    Exercise Name 2  behavioral changes  -      Additional Comments  hydration good to bad (75 oz total per day with 25 or less irritant), void frequency of 1.5 hours.   -         Exercise 3    Exercise Name 3  toileting position   -SW      Additional Comments  demo with teach back for improved emptying of bladder and full elimination of bladder.  -         Exercise 4    Exercise Name 4  neuro-muscular quieting of PF / bladder  -SW      Additional Comments  demo with teach back urge suppression techniques.  -        User Key  (r) = Recorded By, (t) = Taken By, (c) = Cosigned By    Initials Name Provider Type    SW Linda Leahy, PT DPT Physical Therapist                      PT OP Goals     Row Name 10/19/20 1000          PT Short Term Goals    STG Date to Achieve  10/30/20  -     STG 1  patient to be independent with diary completion x 3 days   -     STG 1 Progress  Met  -     STG 2  patient to comply with pelvic rest position x 10 min daily to assist with approximation of POP  -     STG 2 Progress  Progressing  -     STG 3  patient to verbalize hydration goals and dec irritants as to provide best environment for POP and avoid UI.   -     STG 3 Progress  Progressing  -     STG 4  patient to demo toileting position to improve elimination and evacuation procedures without strain .  -     STG 4 Progress  Progressing  -     STG 5  patient to demonstrate normal resting tone to PF in supine position of 2.9mv or less with consistency x 3 minutes.    -     STG 5 Progress  New  -     STG 6  patient to show isolated PF recruitment of 10 reps with full return to baseline without delay  -     STG 6 Progress  New  [facility]        Long Term Goals    LTG Date to Achieve  01/01/21  -     LTG 1  void frequency of every 3-4 hours during the day to improve urinary urgency and control   -     LTG 1 Progress  New  -     LTG 2  patient to show improved coordination of PF recruitment in seated and/or standing postures to allow QF activation with full return to baseline x 10 ea showing amplitude rise of at least 4+ mv.   -     LTG 2 Progress  New  -     LTG 3  patient to report with improved endurance to PF in seated and/or standing postures of 5-10 sec with good stability and without substitution patterns.   -     LTG 3 Progress  New  -     LTG 4  UI to be reduced by 70% overall with reduction to pantyliner vs bladder control pad   -     LTG 4 Progress  New  -     LTG 5  patient to demonstrate proper body mechanics with daily activities as to promote stabilized PF position without strain to lift.   -     LTG 5 Progress  New  [facility]        Time Calculation    PT Goal Re-Cert Due Date  10/30/20  -       User Key  (r) = Recorded By, (t) = Taken By, (c) = Cosigned By    Initials Name Provider Type     Linda Leahy, PT DPT Physical Therapist           Therapy Education  Given: HEP, Symptoms/condition management, Other (comment)(behavioral training techniques.)  Program: New, Reinforced  How Provided: Verbal, Demonstration, Written  Provided to: Patient  Level of Understanding: Teach back education performed, Verbalized, Demonstrated              Time Calculation:   Start Time: 1020  Stop Time: 1123  Time Calculation (min): 63 min  Therapy Charges for Today     Code Description Service Date Service Provider Modifiers Qty    [id number]  PT NEUROMUSC RE EDUCATION EA 15 MIN 10/19/2020 Linda Leahy, PT DPT GP 4                    Linda Boo  Armond, PT DPT  10/19/2020

## 2020-10-26 ENCOUNTER — APPOINTMENT (OUTPATIENT)
Dept: PHYSICAL THERAPY | Facility: HOSPITAL | Age: 45
End: 2020-10-26

## 2020-11-02 ENCOUNTER — APPOINTMENT (OUTPATIENT)
Dept: PHYSICAL THERAPY | Facility: HOSPITAL | Age: 45
End: 2020-11-02

## 2020-11-09 ENCOUNTER — APPOINTMENT (OUTPATIENT)
Dept: PHYSICAL THERAPY | Facility: HOSPITAL | Age: 45
End: 2020-11-09

## 2020-11-11 ENCOUNTER — LAB (OUTPATIENT)
Dept: LAB | Facility: HOSPITAL | Age: 45
End: 2020-11-11

## 2020-11-11 ENCOUNTER — OFFICE VISIT (OUTPATIENT)
Dept: FAMILY MEDICINE CLINIC | Facility: CLINIC | Age: 45
End: 2020-11-11

## 2020-11-11 VITALS
DIASTOLIC BLOOD PRESSURE: 80 MMHG | SYSTOLIC BLOOD PRESSURE: 120 MMHG | WEIGHT: 199 LBS | TEMPERATURE: 98 F | BODY MASS INDEX: 35.26 KG/M2 | HEIGHT: 63 IN

## 2020-11-11 DIAGNOSIS — Z01.818 PRE-OPERATIVE CLEARANCE: Primary | ICD-10-CM

## 2020-11-11 LAB
ALBUMIN SERPL-MCNC: 5 G/DL (ref 3.5–5.2)
ALBUMIN/GLOB SERPL: 2.2 G/DL
ALP SERPL-CCNC: 64 U/L (ref 39–117)
ALT SERPL W P-5'-P-CCNC: 70 U/L (ref 1–33)
ANION GAP SERPL CALCULATED.3IONS-SCNC: 10.4 MMOL/L (ref 5–15)
AST SERPL-CCNC: 61 U/L (ref 1–32)
BASOPHILS # BLD AUTO: 0.07 10*3/MM3 (ref 0–0.2)
BASOPHILS NFR BLD AUTO: 0.7 % (ref 0–1.5)
BILIRUB SERPL-MCNC: 0.2 MG/DL (ref 0–1.2)
BUN SERPL-MCNC: 16 MG/DL (ref 6–20)
BUN/CREAT SERPL: 25.4 (ref 7–25)
CALCIUM SPEC-SCNC: 10.3 MG/DL (ref 8.6–10.5)
CHLORIDE SERPL-SCNC: 100 MMOL/L (ref 98–107)
CO2 SERPL-SCNC: 30.6 MMOL/L (ref 22–29)
CREAT SERPL-MCNC: 0.63 MG/DL (ref 0.57–1)
DEPRECATED RDW RBC AUTO: 40.8 FL (ref 37–54)
EOSINOPHIL # BLD AUTO: 0.21 10*3/MM3 (ref 0–0.4)
EOSINOPHIL NFR BLD AUTO: 2.2 % (ref 0.3–6.2)
ERYTHROCYTE [DISTWIDTH] IN BLOOD BY AUTOMATED COUNT: 13.5 % (ref 12.3–15.4)
GFR SERPL CREATININE-BSD FRML MDRD: 101 ML/MIN/1.73
GLOBULIN UR ELPH-MCNC: 2.3 GM/DL
GLUCOSE SERPL-MCNC: 137 MG/DL (ref 65–99)
HCT VFR BLD AUTO: 39.2 % (ref 34–46.6)
HGB BLD-MCNC: 13.4 G/DL (ref 12–15.9)
IMM GRANULOCYTES # BLD AUTO: 0.06 10*3/MM3 (ref 0–0.05)
IMM GRANULOCYTES NFR BLD AUTO: 0.6 % (ref 0–0.5)
IRON 24H UR-MRATE: 103 MCG/DL (ref 37–145)
LYMPHOCYTES # BLD AUTO: 3.65 10*3/MM3 (ref 0.7–3.1)
LYMPHOCYTES NFR BLD AUTO: 37.5 % (ref 19.6–45.3)
MCH RBC QN AUTO: 28.7 PG (ref 26.6–33)
MCHC RBC AUTO-ENTMCNC: 34.2 G/DL (ref 31.5–35.7)
MCV RBC AUTO: 83.9 FL (ref 79–97)
MONOCYTES # BLD AUTO: 0.67 10*3/MM3 (ref 0.1–0.9)
MONOCYTES NFR BLD AUTO: 6.9 % (ref 5–12)
NEUTROPHILS NFR BLD AUTO: 5.07 10*3/MM3 (ref 1.7–7)
NEUTROPHILS NFR BLD AUTO: 52.1 % (ref 42.7–76)
NRBC BLD AUTO-RTO: 0 /100 WBC (ref 0–0.2)
PLATELET # BLD AUTO: 331 10*3/MM3 (ref 140–450)
PMV BLD AUTO: 10.6 FL (ref 6–12)
POTASSIUM SERPL-SCNC: 4 MMOL/L (ref 3.5–5.2)
PROT SERPL-MCNC: 7.3 G/DL (ref 6–8.5)
RBC # BLD AUTO: 4.67 10*6/MM3 (ref 3.77–5.28)
SODIUM SERPL-SCNC: 141 MMOL/L (ref 136–145)
TSH SERPL DL<=0.05 MIU/L-ACNC: 3.32 UIU/ML (ref 0.27–4.2)
WBC # BLD AUTO: 9.73 10*3/MM3 (ref 3.4–10.8)

## 2020-11-11 PROCEDURE — 99213 OFFICE O/P EST LOW 20 MIN: CPT | Performed by: NURSE PRACTITIONER

## 2020-11-11 PROCEDURE — 93010 ELECTROCARDIOGRAM REPORT: CPT | Performed by: INTERNAL MEDICINE

## 2020-11-11 PROCEDURE — 80053 COMPREHEN METABOLIC PANEL: CPT | Performed by: NURSE PRACTITIONER

## 2020-11-11 PROCEDURE — 36415 COLL VENOUS BLD VENIPUNCTURE: CPT | Performed by: NURSE PRACTITIONER

## 2020-11-11 PROCEDURE — 85025 COMPLETE CBC W/AUTO DIFF WBC: CPT | Performed by: NURSE PRACTITIONER

## 2020-11-11 PROCEDURE — 93000 ELECTROCARDIOGRAM COMPLETE: CPT | Performed by: NURSE PRACTITIONER

## 2020-11-11 PROCEDURE — 84443 ASSAY THYROID STIM HORMONE: CPT | Performed by: NURSE PRACTITIONER

## 2020-11-11 PROCEDURE — 83540 ASSAY OF IRON: CPT | Performed by: NURSE PRACTITIONER

## 2020-11-11 NOTE — PROGRESS NOTES
Chief Complaint   Patient presents with   • Pre-op Exam     surgery scheduled 2020 with Dr bhanu Trivedi   Karen Estrada is a 45 y.o. female.     Presents with pre op clearance for upcoming surgery -scheduled on [id number]-see report from [facility]     Pelvic Pain  The patient's primary symptoms include pelvic pain. The patient's pertinent negatives include no genital itching, genital lesions, genital odor, genital rash, missed menses, vaginal bleeding or vaginal discharge. The pain is mild. The problem affects both sides. She is not pregnant. Associated symptoms include urgency. Pertinent negatives include no abdominal pain, anorexia, back pain, chills, constipation, diarrhea, discolored urine, dysuria, fever, flank pain, frequency, headaches, hematuria, joint pain, joint swelling, painful intercourse, rash, sore throat or vomiting. The treatment provided mild relief. She is sexually active. No, her partner does not have an STD. She is postmenopausal (hysterectomy ). There is no history of an abdominal surgery, a  section, menorrhagia, metrorrhagia, miscarriage, ovarian cysts, perineal abscess, PID or an STD.        The following portions of the patient's history were reviewed and updated as appropriate: allergies, current medications, past social history and problem list.    Review of Systems   Constitutional: Negative for chills and fever.   HENT: Negative.  Negative for sore throat.    Eyes: Negative.  Negative for pain, discharge, redness, itching and visual disturbance.   Respiratory: Negative.  Negative for apnea, chest tightness, wheezing and stridor.    Cardiovascular: Negative.  Negative for palpitations and leg swelling.   Gastrointestinal: Negative.  Negative for abdominal pain, anal bleeding, anorexia, blood in stool, constipation, diarrhea and vomiting.   Endocrine: Negative.  Negative for cold intolerance, heat intolerance, polydipsia, polyphagia and polyuria.  "  Genitourinary: Positive for genital sores, pelvic pain and urgency. Negative for dysuria, flank pain, frequency, hematuria, menorrhagia, missed menses and vaginal discharge.        Prolapse-scheduled for cystoscopy, midurethrial sling, bilateral salpingectomy, colpoorhaphy and sacrocolpopexy    Musculoskeletal: Negative.  Negative for arthralgias, back pain, gait problem and joint pain.   Skin: Negative.  Negative for rash.   Allergic/Immunologic: Negative.  Negative for environmental allergies, food allergies and immunocompromised state.   Neurological: Negative.  Negative for dizziness, facial asymmetry, light-headedness and headaches.   Hematological: Negative.  Negative for adenopathy. Does not bruise/bleed easily.   Psychiatric/Behavioral: Negative for agitation, behavioral problems, confusion and decreased concentration.       Objective   /80   Temp 98 °F (36.7 °C) (Tympanic)   Ht 160 cm (63\")   Wt 90.3 kg (199 lb)   BMI 35.25 kg/m²   Physical Exam  Vitals signs and nursing note reviewed.   Constitutional:       Appearance: Normal appearance.   HENT:      Head: Normocephalic and atraumatic.      Right Ear: Tympanic membrane normal.      Nose: Nose normal.      Mouth/Throat:      Mouth: Mucous membranes are moist.   Eyes:      Pupils: Pupils are equal, round, and reactive to light.   Cardiovascular:      Rate and Rhythm: Normal rate.      Pulses: Normal pulses.      Heart sounds: Murmur present.      Comments: murmur  Pulmonary:      Effort: Pulmonary effort is normal.      Breath sounds: Normal breath sounds.   Abdominal:      General: Abdomen is flat. There is no distension.      Palpations: Abdomen is soft. There is no mass.      Tenderness: There is no abdominal tenderness.      Hernia: No hernia is present.   Skin:     General: Skin is warm.   Neurological:      General: No focal deficit present.      Mental Status: She is alert.      Cranial Nerves: No cranial nerve deficit.      Sensory: No " sensory deficit.      Motor: No weakness.      Coordination: Coordination normal.      Gait: Gait normal.      Deep Tendon Reflexes: Reflexes normal.         Assessment/Plan   Problems Addressed this Visit     None      Visit Diagnoses     Pre-operative clearance    -  Primary    Relevant Orders    XR Chest PA & Lateral (Completed)    CBC & Differential    Comprehensive Metabolic Panel    Iron    TSH    ECG 12 Lead (Completed)    CBC Auto Differential      Diagnoses       Codes Comments    Pre-operative clearance    -  Primary ICD-10-CM: Z01.818  ICD-9-CM: V72.84          No orders of the defined types were placed in this encounter.      It's not just what you eat, but when you eat  Eat breakfast, and eat smaller meals throughout the day. A healthy breakfast can jumpstart your metabolism, while eating small, healthy meals (rather than the standard three large meals) keeps your energy up.   Avoid eating at night. Try to eat dinner earlier and fast for 14-16 hours until breakfast the next morning. Studies suggest that eating only when you’re most active and giving your digestive system a long break each day may help to regulate weight.   Narrative & Impression     TWO VIEW CHEST     HISTORY: Preoperative clearance     Frontal and lateral films of the chest were obtained.     COMPARISON: October 14, 2019     FINDINGS:    The lungs are clear of an acute process.  The heart is not enlarged.  The pulmonary vasculature is not increased.  No pleural effusion.  No pneumothorax.  No acute osseous abnormality.  Degenerative changes are present in the thoracic spine.     IMPRESSION:  CONCLUSION:  No Acute Disease     [id number]     Electronically signed by:  Theodore Carmichael MD  11/11/2020 12:36 PM  CST Workstation: [id number]            Specimen Collected: 11/11/20 12:17 Last Resulted: 11/11/20 12:36        Order Details            Cleared for surgery -optimal health at this time-ekg chronic changes-history of murmur, chest xray  clear, labs wnl

## 2020-11-16 ENCOUNTER — APPOINTMENT (OUTPATIENT)
Dept: PHYSICAL THERAPY | Facility: HOSPITAL | Age: 45
End: 2020-11-16

## 2020-11-18 ENCOUNTER — TELEPHONE (OUTPATIENT)
Dept: FAMILY MEDICINE CLINIC | Facility: CLINIC | Age: 45
End: 2020-11-18

## 2020-11-23 ENCOUNTER — HOSPITAL ENCOUNTER (OUTPATIENT)
Dept: PHYSICAL THERAPY | Facility: HOSPITAL | Age: 45
Setting detail: THERAPIES SERIES
Discharge: HOME | End: 2020-11-23

## 2020-11-23 DIAGNOSIS — N39.46 MIXED URINARY INCONTINENCE DUE TO FEMALE GENITAL PROLAPSE: ICD-10-CM

## 2020-11-23 DIAGNOSIS — N81.4 CYSTOCELE WITH PROLAPSE: ICD-10-CM

## 2020-11-23 DIAGNOSIS — N81.9 MIXED URINARY INCONTINENCE DUE TO FEMALE GENITAL PROLAPSE: ICD-10-CM

## 2020-11-23 DIAGNOSIS — N39.3 STRESS INCONTINENCE OF URINE: ICD-10-CM

## 2020-11-23 DIAGNOSIS — N81.89 PELVIC FLOOR WEAKNESS IN FEMALE: Primary | ICD-10-CM

## 2020-11-23 PROCEDURE — 97535 SELF CARE MNGMENT TRAINING: CPT | Performed by: PHYSICAL THERAPIST

## 2020-11-23 RX ORDER — MELOXICAM 7.5 MG/1
TABLET ORAL
Qty: 60 TABLET | Refills: 5 | Status: SHIPPED | OUTPATIENT
Start: 2020-11-23

## 2020-11-23 NOTE — THERAPY DISCHARGE NOTE
Outpatient Physical Therapy Pelvic Health Progress Note/Discharge Summary  [facility]     Patient Name: Karen Estrada  : 1975  MRN: [id number]  Today's Date: 2020        Visit Date: 2020   Visit number: 3/3  Recheck: NA due to dc this date  Insurance: [employer] 25 v/year      Visit Dx:    ICD-10-CM ICD-9-CM   1. Pelvic floor weakness in female  N81.89 618.89   2. Cystocele with prolapse  N81.4 618.4   3. Stress incontinence of urine  N39.3 [id number]   4. Mixed urinary incontinence due to female genital prolapse  N39.46 788.33    N81.9 618.9       Patient Active Problem List   Diagnosis   • Bilateral low back pain without sciatica   • Nerve pain   • Swelling of left elbow   • Numbness and tingling of right leg   • Urinary incontinence   • Left knee injury, initial encounter   • Fall down stairs   • Arthralgia   • Internal derangement of left knee   • Sprain of medial collateral ligament of left knee   • Knee effusion, left   • Neck pain   • Parotid gland pain   • Anxiety   • Malaise and fatigue   • Hypertension   • Encounter for screening mammogram for malignant neoplasm of breast   • Acute pain of left shoulder   • Cystocele with prolapse   • Mixed urinary incontinence due to female genital prolapse   • [place] grade 4 cystocele        Pelvic Health     Row Name 20 1000             Subjective Comments    Subjective Comments  FU completed with urogynecology.  UDS was also completed which she failed.  I constantly leaked with filling of her bladder and also with coughing.  They are planning surgery on Dec 17th.  They will do robotic surgery for mesh/sling placement as well as removal of ovary and tubes.  She is unsure if he will order PT following. She  has been doing behavioral management techniques and plans to do so to help her leaking.  She has also been practicing PF contractions post toileting and feels muscles are getting stronger.  She desires to return  post surgery, but would like to hold her visits til after surgery.   -SW         Pregnancy Questions    Number of Pregnancies  5  -SW      Number of Miscarriages  1  -SW      Has the patient had an ?  No  -SW      Number of Children  4  -SW      Type of Previous Deliveries  Vaginal;  -SW         Pain Assessment    Pain Assessment  0-10  -SW      Pain Score  3  -SW      Post Pain Score  3  -SW      Pain Location  Vagina  -SW         Pelvic Floor Muscle    External Pelvic Floor Comments  able to contract PF muscles with cues.  Some inc challenges with contraction with exhalation of breath.  Diaphragmatic breath with good demonstration.    -SW         Observations    Perineal Observation Performed?  -- pt deferred.   -SW      Posture Observations  Alert with good PF awareness this visit.  Posturally upright with no distress in appearance.   -         SEMG Evaluation    SEMG Evaluation Comments  deferred  -         Education Provided On:    Education Points  Information on dietary irritants to bladder/bowels;Minimize Irritation to Vulvar Tissue with Handout;Behavioral modifications  -      Method of Delivery  Verbal;Demonstration;Written  -      Education Provided To  Patient  -SW      Level of Understanding  Teach back education performed;Verbalized;Demonstrated  -      HEP Comments  pre-hysterectomy info, post op instructions, PF HEP, continued behavioral managment.  -        User Key  (r) = Recorded By, (t) = Taken By, (c) = Cosigned By    Initials Name Provider Type    Linda Perez, PT DPT Physical Therapist                       PT Assessment/Plan     Row Name 20 1000          PT Assessment    Functional Limitations  Limitation in home management;Limitations in community activities;Performance in leisure activities;Performance in self-care ADL;Performance in work activities  -     Impairments  Impaired muscle endurance;Impaired muscle power;Muscle strength;Pain;Poor  body mechanics;Posture;Other (comment) UI and POP; constipation  -     Assessment Comments  Patient with good understanding of HEP as prescribed.  She needs cues for exhalation breath with contraction, but improved with practice and demonstration.  She plans for surgerical intervention early Dec.  She desires to cont with HEP as prescribed and possibly return if MD orders post operatively.  Anticipate good compliance with HEP.  STG 1-4 met and LTG 1 met at this time.  Limited goal progression due to time prior to d/c due to pending surgical intervention and patient declination of examination prior to DC.  Good understanding of program and DC planning.    -     Rehab Potential  Good  -     Patient/caregiver participated in establishment of treatment plan and goals  Yes  -SW     Patient would benefit from skilled therapy intervention  Yes  -        PT Plan    PT Frequency  -- DC per patient request  -     PT Plan Comments  Patient will move toward transition to HEP with pending surgical intervention early Dec.  She will consult with questions and possible return post-operatively.  She understands HEP to continue to assist with strength and coordination of PF prior to surgery.  Will consult with questions or concerns.   -       User Key  (r) = Recorded By, (t) = Taken By, (c) = Cosigned By    Initials Name Provider Type    Linda Perez, PT DPT Physical Therapist              OP Exercises     Row Name 11/23/20 1000             Subjective Comments    Subjective Comments  FU completed with urogynecology.  UDS was also completed which she failed.  I constantly leaked with filling of her bladder and also with coughing.  They are planning surgery on Dec 17th.  They will do robotic surgery for mesh/sling placement as well as removal of ovary and tubes.  She is unsure if he will order PT following. She  has been doing behavioral management techniques and plans to do so to help her leaking.  She has also  been practicing PF contractions post toileting and feels muscles are getting stronger.  She desires to return post surgery, but would like to hold her visits til after surgery.   -         Exercise 1    Exercise Name 1  HEP QF and endurance PF contractions  -      Additional Comments  QF post void x 5 reps.  Endurance x 3 sec holds x 5 reps up to 8 times per day. Able to demonstrate with little to no verbal cues required.  Good PF awareness with recruitment.   -         Exercise 2    Exercise Name 2  diaphragmatic breathing  -      Additional Comments  able to demonstrate normal diaphragmatic breath consistenly x 3+ minutes.   -         Exercise 3    Exercise Name 3  diaphragmatic breath with PF recruitment  -      Additional Comments  some challenges identified with PF recruitment with diaphragmatic breath.  Patient typically recruits in reverse patterns.  Challenged, but able to demonstrate coodination with verbalized cues. Improved with practice.    -         Exercise 4    Exercise Name 4  Post op hysterectomy regimen  -      Additional Comments  Pt instructed that all MD parameters of post op recovery are somewhat different, but met with some of same restrictions.  Encouraged OOB with log roll vs abd sit up.  LE exercises as permitted to inc blood flow and dec clots.  Body mechanics for floor to waist t/f.  Lifting restrictions of no greater than 10#.  Signs for concern including excess in bleeding, swelling/warmth of temp to LE (specifically if one vs both) and when to call the MD.  Patient also instructed of at least 6-8 weeks pelvic rest depending on MD protocol.  Call with questions as needed.  She verbalized understanding of concepts taught this visit.    -        User Key  (r) = Recorded By, (t) = Taken By, (c) = Cosigned By    Initials Name Provider Type    Linda Perez, PT DPT Physical Therapist                      PT OP Goals     Row Name 11/23/20 1000          PT Short Term  Goals    STG Date to Achieve  --  -     STG 1  patient to be independent with diary completion x 3 days   -[facility] 1 Progress  Met  -[facility] 2  patient to comply with pelvic rest position x 10 min daily to assist with approximation of POP  -     STG 2 Progress  Met  -     STG 3  patient to verbalize hydration goals and dec irritants as to provide best environment for POP and avoid UI.   -     STG 3 Progress  Met  -[facility] 4  patient to demo toileting position to improve elimination and evacuation procedures without strain .  -[facility] 4 Progress  Met  -[facility] 5  patient to demonstrate normal resting tone to PF in supine position of 2.9mv or less with consistency x 3 minutes.   -     STG 5 Progress  New  -[facility] 5 Progress Comments  patient deferred assessment  -[facility] 6  patient to show isolated PF recruitment of 10 reps with full return to baseline without delay  -[facility] 6 Progress  New  -[facility] 6 Progress Comments  patient deferred assessment  -        Long Term Goals    LTG Date to Achieve  01/01/21  -     LTG 1  void frequency of every 3-4 hours during the day to improve urinary urgency and control   -     LTG 1 Progress  Met  -     LTG 2  patient to show improved coordination of PF recruitment in seated and/or standing postures to allow QF activation with full return to baseline x 10 ea showing amplitude rise of at least 4+ mv.   -     LTG 2 Progress  New  -     LTG 2 Progress Comments  patient deferred assessment  -     LTG 3  patient to report with improved endurance to PF in seated and/or standing postures of 5-10 sec with good stability and without substitution patterns.   -     LTG 3 Progress  New  -     LTG 4  UI to be reduced by 70% overall with reduction to pantyliner vs bladder control pad   -     LTG 4 Progress  Progressing  -     LTG 5  patient to demonstrate proper body mechanics with daily activities as to promote stabilized PF position  without strain to lift.   -     LTG 5 Progress  Progressing  -        Time Calculation    PT Goal Re-Cert Due Date  -- DC per patient request with pending surgery  -       User Key  (r) = Recorded By, (t) = Taken By, (c) = Cosigned By    Initials Name Provider Type    Linda Perez, PT DPT Physical Therapist           Therapy Education  Given: HEP, Symptoms/condition management, Other (comment)(post op training and expectancies)  Program: Reinforced, Progressed  How Provided: Verbal, Demonstration  Provided to: Patient  Level of Understanding: Teach back education performed, Verbalized, Demonstrated            Time Calculation:   Start Time: 1018  Stop Time: 1112  Time Calculation (min): 54 min    Therapy Charges for Today     Code Description Service Date Service Provider Modifiers Qty    [id number] HC PT SELF CARE/MGMT/TRAIN EA 15 MIN 11/23/2020 Linda Leahy, PT DPT GP 4               OP PT Discharge Summary  Date of Discharge: 11/23/20  Reason for Discharge: Maximum functional potential achieved(Patient requested DC due to pending surgical intervention in early Dec.)  Outcomes Achieved: Patient able to partially acheive established goals, Refer to plan of care for updates on goals achieved  Discharge Destination: Home with home program  Discharge Instructions/Additional Comments: patient understands HEP as assigned.      Linda Leahy, PT DPT  11/23/2020

## 2020-12-11 LAB
QT INTERVAL: 464 MS
QTC INTERVAL: 464 MS

## 2021-01-19 RX ORDER — LEVOTHYROXINE SODIUM 0.05 MG/1
50 TABLET ORAL DAILY
Qty: 30 TABLET | Refills: 5 | Status: SHIPPED | OUTPATIENT
Start: 2021-01-19 | End: 2021-07-12

## 2021-06-01 RX ORDER — ESCITALOPRAM OXALATE 10 MG/1
10 TABLET ORAL DAILY
Qty: 30 TABLET | Refills: 2 | Status: SHIPPED | OUTPATIENT
Start: 2021-06-01 | End: 2021-10-14

## 2021-07-06 ENCOUNTER — HOSPITAL ENCOUNTER (EMERGENCY)
Facility: HOSPITAL | Age: 46
Discharge: LEFT BEFORE DISCHARGE | End: 2021-07-06

## 2021-07-06 VITALS
HEIGHT: 63 IN | BODY MASS INDEX: 34.02 KG/M2 | HEART RATE: 98 BPM | DIASTOLIC BLOOD PRESSURE: 94 MMHG | SYSTOLIC BLOOD PRESSURE: 198 MMHG | TEMPERATURE: 98 F | WEIGHT: 192 LBS | OXYGEN SATURATION: 97 % | RESPIRATION RATE: 20 BRPM

## 2021-07-06 PROCEDURE — 93010 ELECTROCARDIOGRAM REPORT: CPT | Performed by: INTERNAL MEDICINE

## 2021-07-06 PROCEDURE — 93005 ELECTROCARDIOGRAM TRACING: CPT | Performed by: OBSTETRICS & GYNECOLOGY

## 2021-07-06 PROCEDURE — 99211 OFF/OP EST MAY X REQ PHY/QHP: CPT

## 2021-07-07 LAB
QT INTERVAL: 360 MS
QTC INTERVAL: 452 MS

## 2021-07-09 ENCOUNTER — LAB (OUTPATIENT)
Dept: LAB | Facility: HOSPITAL | Age: 46
End: 2021-07-09

## 2021-07-09 ENCOUNTER — OFFICE VISIT (OUTPATIENT)
Dept: FAMILY MEDICINE CLINIC | Facility: CLINIC | Age: 46
End: 2021-07-09

## 2021-07-09 VITALS
OXYGEN SATURATION: 99 % | HEART RATE: 64 BPM | SYSTOLIC BLOOD PRESSURE: 150 MMHG | DIASTOLIC BLOOD PRESSURE: 98 MMHG | HEIGHT: 63 IN | TEMPERATURE: 97.8 F | WEIGHT: 202 LBS | BODY MASS INDEX: 35.79 KG/M2

## 2021-07-09 DIAGNOSIS — R07.9 CHEST PAIN, UNSPECIFIED TYPE: Primary | ICD-10-CM

## 2021-07-09 DIAGNOSIS — R10.13 EPIGASTRIC PAIN: ICD-10-CM

## 2021-07-09 DIAGNOSIS — I10 HYPERTENSION, UNSPECIFIED TYPE: ICD-10-CM

## 2021-07-09 DIAGNOSIS — E03.9 HYPOTHYROIDISM (ACQUIRED): ICD-10-CM

## 2021-07-09 DIAGNOSIS — R73.09 BLOOD SUGAR INCREASED: ICD-10-CM

## 2021-07-09 LAB
25(OH)D3 SERPL-MCNC: 37.1 NG/ML
ALBUMIN SERPL-MCNC: 4.8 G/DL (ref 3.5–5.2)
ALBUMIN/GLOB SERPL: 1.9 G/DL
ALP SERPL-CCNC: 78 U/L (ref 39–117)
ALT SERPL W P-5'-P-CCNC: 104 U/L (ref 1–33)
ANION GAP SERPL CALCULATED.3IONS-SCNC: 11.9 MMOL/L (ref 5–15)
AST SERPL-CCNC: 102 U/L (ref 1–32)
BASOPHILS # BLD AUTO: 0.07 10*3/MM3 (ref 0–0.2)
BASOPHILS NFR BLD AUTO: 0.8 % (ref 0–1.5)
BILIRUB SERPL-MCNC: 0.3 MG/DL (ref 0–1.2)
BUN SERPL-MCNC: 9 MG/DL (ref 6–20)
BUN/CREAT SERPL: 14.3 (ref 7–25)
CALCIUM SPEC-SCNC: 10 MG/DL (ref 8.6–10.5)
CHLORIDE SERPL-SCNC: 104 MMOL/L (ref 98–107)
CHOLEST SERPL-MCNC: 303 MG/DL (ref 0–200)
CO2 SERPL-SCNC: 24.1 MMOL/L (ref 22–29)
CREAT SERPL-MCNC: 0.63 MG/DL (ref 0.57–1)
DEPRECATED RDW RBC AUTO: 39.8 FL (ref 37–54)
EOSINOPHIL # BLD AUTO: 0.32 10*3/MM3 (ref 0–0.4)
EOSINOPHIL NFR BLD AUTO: 3.6 % (ref 0.3–6.2)
ERYTHROCYTE [DISTWIDTH] IN BLOOD BY AUTOMATED COUNT: 13 % (ref 12.3–15.4)
GFR SERPL CREATININE-BSD FRML MDRD: 101 ML/MIN/1.73
GLOBULIN UR ELPH-MCNC: 2.5 GM/DL
GLUCOSE SERPL-MCNC: 106 MG/DL (ref 65–99)
HBA1C MFR BLD: 6.77 % (ref 4.8–5.6)
HCT VFR BLD AUTO: 36.7 % (ref 34–46.6)
HDLC SERPL-MCNC: 43 MG/DL (ref 40–60)
HGB BLD-MCNC: 12.4 G/DL (ref 12–15.9)
IMM GRANULOCYTES # BLD AUTO: 0.08 10*3/MM3 (ref 0–0.05)
IMM GRANULOCYTES NFR BLD AUTO: 0.9 % (ref 0–0.5)
IRON 24H UR-MRATE: 99 MCG/DL (ref 37–145)
LDLC SERPL CALC-MCNC: 212 MG/DL (ref 0–100)
LDLC/HDLC SERPL: 4.93 {RATIO}
LYMPHOCYTES # BLD AUTO: 3.29 10*3/MM3 (ref 0.7–3.1)
LYMPHOCYTES NFR BLD AUTO: 37.4 % (ref 19.6–45.3)
MAGNESIUM SERPL-MCNC: 2.1 MG/DL (ref 1.6–2.6)
MCH RBC QN AUTO: 28.5 PG (ref 26.6–33)
MCHC RBC AUTO-ENTMCNC: 33.8 G/DL (ref 31.5–35.7)
MCV RBC AUTO: 84.4 FL (ref 79–97)
MONOCYTES # BLD AUTO: 0.71 10*3/MM3 (ref 0.1–0.9)
MONOCYTES NFR BLD AUTO: 8.1 % (ref 5–12)
NEUTROPHILS NFR BLD AUTO: 4.32 10*3/MM3 (ref 1.7–7)
NEUTROPHILS NFR BLD AUTO: 49.2 % (ref 42.7–76)
NRBC BLD AUTO-RTO: 0.1 /100 WBC (ref 0–0.2)
PLATELET # BLD AUTO: 277 10*3/MM3 (ref 140–450)
PMV BLD AUTO: 11.1 FL (ref 6–12)
POTASSIUM SERPL-SCNC: 4 MMOL/L (ref 3.5–5.2)
PROT SERPL-MCNC: 7.3 G/DL (ref 6–8.5)
RBC # BLD AUTO: 4.35 10*6/MM3 (ref 3.77–5.28)
SODIUM SERPL-SCNC: 140 MMOL/L (ref 136–145)
TRIGL SERPL-MCNC: 239 MG/DL (ref 0–150)
TSH SERPL DL<=0.05 MIU/L-ACNC: 8.16 UIU/ML (ref 0.27–4.2)
VIT B12 BLD-MCNC: 755 PG/ML (ref 211–946)
VLDLC SERPL-MCNC: 48 MG/DL (ref 5–40)
WBC # BLD AUTO: 8.79 10*3/MM3 (ref 3.4–10.8)

## 2021-07-09 PROCEDURE — 83540 ASSAY OF IRON: CPT | Performed by: NURSE PRACTITIONER

## 2021-07-09 PROCEDURE — 82306 VITAMIN D 25 HYDROXY: CPT | Performed by: NURSE PRACTITIONER

## 2021-07-09 PROCEDURE — 80053 COMPREHEN METABOLIC PANEL: CPT | Performed by: NURSE PRACTITIONER

## 2021-07-09 PROCEDURE — 36415 COLL VENOUS BLD VENIPUNCTURE: CPT | Performed by: NURSE PRACTITIONER

## 2021-07-09 PROCEDURE — 82607 VITAMIN B-12: CPT | Performed by: NURSE PRACTITIONER

## 2021-07-09 PROCEDURE — 80061 LIPID PANEL: CPT | Performed by: NURSE PRACTITIONER

## 2021-07-09 PROCEDURE — 85025 COMPLETE CBC W/AUTO DIFF WBC: CPT | Performed by: NURSE PRACTITIONER

## 2021-07-09 PROCEDURE — 83036 HEMOGLOBIN GLYCOSYLATED A1C: CPT | Performed by: NURSE PRACTITIONER

## 2021-07-09 PROCEDURE — 84443 ASSAY THYROID STIM HORMONE: CPT | Performed by: NURSE PRACTITIONER

## 2021-07-09 PROCEDURE — 99214 OFFICE O/P EST MOD 30 MIN: CPT | Performed by: NURSE PRACTITIONER

## 2021-07-09 PROCEDURE — 83735 ASSAY OF MAGNESIUM: CPT | Performed by: NURSE PRACTITIONER

## 2021-07-09 RX ORDER — LOSARTAN POTASSIUM 100 MG/1
100 TABLET ORAL DAILY
Qty: 90 TABLET | Refills: 3 | Status: SHIPPED | OUTPATIENT
Start: 2021-07-09 | End: 2022-07-28

## 2021-07-09 RX ORDER — LOSARTAN POTASSIUM 25 MG/1
25 TABLET ORAL DAILY
COMMUNITY
Start: 2021-07-07 | End: 2021-07-09

## 2021-07-09 RX ORDER — LOSARTAN POTASSIUM 50 MG/1
50 TABLET ORAL DAILY
COMMUNITY
Start: 2021-07-07 | End: 2021-07-09

## 2021-07-09 RX ORDER — HYDROCHLOROTHIAZIDE 12.5 MG/1
TABLET ORAL
Qty: 90 TABLET | Refills: 3 | Status: SHIPPED | OUTPATIENT
Start: 2021-07-09

## 2021-07-09 RX ORDER — ATORVASTATIN CALCIUM 40 MG/1
40 TABLET, FILM COATED ORAL DAILY
COMMUNITY
Start: 2021-07-07 | End: 2021-08-12 | Stop reason: SDUPTHER

## 2021-07-09 NOTE — PROGRESS NOTES
Chief Complaint   Patient presents with   • Chest Pain     [facility] 07/06/2021     Subjective   Karen Estrada is a 45 y.o. female.     Chest Pain   This is a new problem. The current episode started in the past 7 days. The onset quality is gradual. The problem has been gradually worsening. The pain is present in the epigastric region. The pain is at a severity of 2/10. The pain is mild. The quality of the pain is described as dull. The pain radiates to the epigastrium. Associated symptoms include abdominal pain and nausea. Pertinent negatives include no back pain, claudication, cough, diaphoresis, dizziness, exertional chest pressure, fever, headaches, hemoptysis, irregular heartbeat, leg pain, lower extremity edema, malaise/fatigue, near-syncope, numbness, orthopnea, palpitations, PND, shortness of breath, sputum production, syncope, vomiting or weakness. The treatment provided mild relief.   Her past medical history is significant for diabetes and thyroid problem.   Pertinent negatives for past medical history include no PVD.   Abdominal Pain  This is a recurrent problem. The current episode started more than 1 month ago. The onset quality is gradual. The pain is located in the epigastric region. The pain is mild. The quality of the pain is dull. The abdominal pain radiates to the periumbilical region. Associated symptoms include belching, flatus and nausea. Pertinent negatives include no arthralgias, constipation, diarrhea, dysuria, fever, frequency, headaches, hematochezia, hematuria, melena, myalgias or vomiting. The pain is aggravated by eating. She has tried antacids for the symptoms. The treatment provided mild relief. There is no history of abdominal surgery, colon cancer, Crohn's disease, irritable bowel syndrome, pancreatitis or PUD.   Hypertension  This is a chronic problem. The problem has been waxing and waning since onset. The problem is uncontrolled. Associated symptoms include chest  pain. Pertinent negatives include no headaches, malaise/fatigue, orthopnea, palpitations, PND or shortness of breath. Current antihypertension treatment includes angiotensin blockers. Compliance problems include diet.  There is no history of angina, kidney disease, CVA, heart failure, PVD or retinopathy. Identifiable causes of hypertension include a thyroid problem.   Thyroid Problem  Presents for follow-up visit. Symptoms include anxiety and fatigue. Patient reports no cold intolerance, constipation, diaphoresis, diarrhea, heat intolerance or palpitations. The symptoms have been stable. Her past medical history is significant for diabetes. There is no history of heart failure.   Blood Sugar Problem  This is a new problem. The current episode started 1 to 4 weeks ago. Associated symptoms include abdominal pain, chest pain, fatigue and nausea. Pertinent negatives include no arthralgias, coughing, diaphoresis, fever, headaches, joint swelling, myalgias, numbness, vomiting or weakness. The treatment provided mild relief.        The following portions of the patient's history were reviewed and updated as appropriate: allergies, current medications, past social history and problem list.    Review of Systems   Constitutional: Positive for activity change and fatigue. Negative for diaphoresis, fever, malaise/fatigue and unexpected weight change.   HENT: Negative.    Eyes: Negative.    Respiratory: Negative.  Negative for cough, hemoptysis, sputum production, choking, chest tightness and shortness of breath.    Cardiovascular: Positive for chest pain. Negative for palpitations, orthopnea, claudication, syncope, PND and near-syncope.   Gastrointestinal: Positive for abdominal pain, flatus and nausea. Negative for constipation, diarrhea, hematochezia, melena and vomiting.   Endocrine: Negative.  Negative for cold intolerance, heat intolerance, polydipsia, polyphagia and polyuria.   Genitourinary: Negative.  Negative for  "dysuria, frequency and hematuria.   Musculoskeletal: Negative for arthralgias, back pain, gait problem, joint swelling and myalgias.   Skin: Negative.    Allergic/Immunologic: Negative.    Neurological: Negative.  Negative for dizziness, weakness, numbness and headaches.   Hematological: Negative.    Psychiatric/Behavioral: Negative for agitation and behavioral problems. The patient is nervous/anxious.        Objective   /98   Pulse 64   Temp 97.8 °F (36.6 °C) (Infrared)   Ht 160 cm (63\")   Wt 91.6 kg (202 lb)   SpO2 99%   BMI 35.78 kg/m²   Physical Exam  Vitals and nursing note reviewed.   Constitutional:       Appearance: Normal appearance.   HENT:      Head: Normocephalic and atraumatic.      Right Ear: Tympanic membrane normal.      Left Ear: Tympanic membrane normal.      Nose: Nose normal.      Mouth/Throat:      Mouth: Mucous membranes are dry.   Eyes:      Extraocular Movements: Extraocular movements intact.      Pupils: Pupils are equal, round, and reactive to light.   Cardiovascular:      Rate and Rhythm: Normal rate and regular rhythm.      Pulses: Normal pulses.      Heart sounds: Normal heart sounds.   Pulmonary:      Effort: Pulmonary effort is normal.      Breath sounds: Normal breath sounds.   Abdominal:      General: Abdomen is flat.      Tenderness: There is abdominal tenderness.   Musculoskeletal:      Cervical back: Normal range of motion and neck supple.   Skin:     General: Skin is warm.   Neurological:      General: No focal deficit present.      Mental Status: She is alert and oriented to person, place, and time.      Cranial Nerves: No cranial nerve deficit.      Sensory: No sensory deficit.      Motor: No weakness.      Coordination: Coordination normal.      Gait: Gait normal.      Deep Tendon Reflexes: Reflexes normal.   Psychiatric:         Mood and Affect: Mood normal.         Behavior: Behavior normal.         Assessment/Plan   Problems Addressed this Visit        Cardiac " and Vasculature    Hypertension    Relevant Medications    losartan (Cozaar) 100 MG tablet    hydroCHLOROthiazide (HYDRODIURIL) 12.5 MG tablet    metFORMIN (Glucophage) 500 MG tablet    levothyroxine (Synthroid) 75 MCG tablet    Other Relevant Orders    CBC & Differential (Completed)    Comprehensive Metabolic Panel (Completed)    Hemoglobin A1c (Completed)    Iron (Completed)    Lipid Panel (Completed)    Vitamin D 25 Hydroxy (Completed)    Vitamin B12 (Completed)    TSH (Completed)    Magnesium (Completed)    CBC & Differential    Comprehensive Metabolic Panel    Hemoglobin A1c    Lipid Panel      Other Visit Diagnoses     Chest pain, unspecified type    -  Primary    Relevant Medications    metFORMIN (Glucophage) 500 MG tablet    levothyroxine (Synthroid) 75 MCG tablet    Other Relevant Orders    CBC & Differential (Completed)    Comprehensive Metabolic Panel (Completed)    Hemoglobin A1c (Completed)    Iron (Completed)    Lipid Panel (Completed)    Vitamin D 25 Hydroxy (Completed)    Vitamin B12 (Completed)    TSH (Completed)    Magnesium (Completed)    Ambulatory Referral to Gastroenterology    CBC & Differential    Comprehensive Metabolic Panel    Hemoglobin A1c    Lipid Panel    Epigastric pain        Relevant Medications    metFORMIN (Glucophage) 500 MG tablet    levothyroxine (Synthroid) 75 MCG tablet    Other Relevant Orders    Ambulatory Referral to Gastroenterology    CBC & Differential    Comprehensive Metabolic Panel    Hemoglobin A1c    Lipid Panel    Blood sugar increased        Relevant Orders    Ambulatory Referral to Diabetic Education (Completed)    Hypothyroidism (acquired)        Relevant Medications    levothyroxine (Synthroid) 75 MCG tablet      Diagnoses       Codes Comments    Chest pain, unspecified type    -  Primary ICD-10-CM: R07.9  ICD-9-CM: 786.50     Hypertension, unspecified type     ICD-10-CM: I10  ICD-9-CM: 401.9     Epigastric pain     ICD-10-CM: R10.13  ICD-9-CM: 789.06      Blood sugar increased     ICD-10-CM: R73.09  ICD-9-CM: 790.29     Hypothyroidism (acquired)     ICD-10-CM: E03.9  ICD-9-CM: 244.9            New Medications Ordered This Visit   Medications   • losartan (Cozaar) 100 MG tablet     Sig: Take 1 tablet by mouth Daily.     Dispense:  90 tablet     Refill:  3   • hydroCHLOROthiazide (HYDRODIURIL) 12.5 MG tablet     Sig: Daily as needed     Dispense:  90 tablet     Refill:  3   • metFORMIN (Glucophage) 500 MG tablet     Sig: Take 1 tablet by mouth 2 (Two) Times a Day With Meals.     Dispense:  30 tablet     Refill:  11   • levothyroxine (Synthroid) 75 MCG tablet     Sig: Take 1 tablet by mouth Daily.     Dispense:  30 tablet     Refill:  11       It's not just what you eat, but when you eat  Eat breakfast, and eat smaller meals throughout the day. A healthy breakfast can jumpstart your metabolism, while eating small, healthy meals (rather than the standard three large meals) keeps your energy up.   Avoid eating at night. Try to eat dinner earlier and fast for 14-16 hours until breakfast the next morning. Studies suggest that eating only when you’re most active and giving your digestive system a long break each day may help to regulate weight.     Stop cozaar 50 and 25 and start 100mg and use 12.5mg hctz only as needed-labs today, refer to gastro for possible egd and needs screening colonoscopy     Hospital chart reviewed, diet discussed, meds as directed -cardiac labs wnl-fatty liver on us     Add metofrmin , increase synthroid to 75mcg see back in 3 months with office visit     Diabetic education

## 2021-07-12 RX ORDER — LANCETS 30 GAUGE
EACH MISCELLANEOUS
Qty: 100 EACH | Refills: 11 | Status: CANCELLED | OUTPATIENT
Start: 2021-07-12

## 2021-07-12 RX ORDER — BLOOD-GLUCOSE METER
KIT MISCELLANEOUS
Qty: 1 EACH | Refills: 0 | Status: SHIPPED | OUTPATIENT
Start: 2021-07-12

## 2021-07-12 RX ORDER — LANCETS 30 GAUGE
EACH MISCELLANEOUS
Qty: 100 EACH | Refills: 11 | Status: SHIPPED | OUTPATIENT
Start: 2021-07-12

## 2021-07-12 RX ORDER — BLOOD-GLUCOSE METER
KIT MISCELLANEOUS
Qty: 1 EACH | Refills: 0 | Status: CANCELLED | OUTPATIENT
Start: 2021-07-12

## 2021-07-12 RX ORDER — LEVOTHYROXINE SODIUM 0.07 MG/1
75 TABLET ORAL DAILY
Qty: 30 TABLET | Refills: 11 | Status: SHIPPED | OUTPATIENT
Start: 2021-07-12 | End: 2022-07-28

## 2021-07-21 ENCOUNTER — TELEPHONE (OUTPATIENT)
Dept: GASTROENTEROLOGY | Facility: CLINIC | Age: 46
End: 2021-07-21

## 2021-07-21 NOTE — TELEPHONE ENCOUNTER
Called patient regarding missed appointment/ No Show appointment with MERARI Barclay.      Left voicemail informing patient she had missed her appointment today.  Asked patient to call back to reschedule if interested in doing so.  CL

## 2021-08-03 ENCOUNTER — TELEPHONE (OUTPATIENT)
Dept: FAMILY MEDICINE CLINIC | Facility: CLINIC | Age: 46
End: 2021-08-03

## 2021-08-03 NOTE — TELEPHONE ENCOUNTER
PT called requesting a refill on her Potassium 50MG . She said you talked about making it 100MG.         [facility]

## 2021-08-12 RX ORDER — ATORVASTATIN CALCIUM 40 MG/1
40 TABLET, FILM COATED ORAL DAILY
Qty: 90 TABLET | Refills: 1 | Status: SHIPPED | OUTPATIENT
Start: 2021-08-12 | End: 2022-01-20

## 2021-08-12 NOTE — TELEPHONE ENCOUNTER
----- Message from Karen Estrada sent at 8/11/2021  9:03 PM CDT -----  Regarding: Prescription Question  Contact: [phone]  I do not have any more refills for the lipitor and I'm out.  Can it be refilled thru [facility]? Thank you

## 2021-08-18 PROBLEM — R74.01 ELEVATED TRANSAMINASE LEVEL: Status: ACTIVE | Noted: 2021-07-07

## 2021-08-18 PROBLEM — E78.2 MIXED HYPERLIPIDEMIA: Status: ACTIVE | Noted: 2021-07-07

## 2021-08-18 PROBLEM — K76.0 FATTY INFILTRATION OF LIVER: Status: ACTIVE | Noted: 2021-07-07

## 2021-08-18 PROBLEM — M47.816 SPONDYLOSIS WITHOUT MYELOPATHY OR RADICULOPATHY, LUMBAR REGION: Status: ACTIVE | Noted: 2017-11-30

## 2021-08-18 PROBLEM — M54.16 LUMBAR RADICULOPATHY: Status: ACTIVE | Noted: 2017-11-30

## 2021-08-18 PROBLEM — E66.09 CLASS 2 OBESITY DUE TO EXCESS CALORIES IN ADULT: Status: ACTIVE | Noted: 2021-07-07

## 2021-08-18 PROBLEM — R07.9 CHEST PAIN: Status: ACTIVE | Noted: 2021-07-06

## 2021-08-18 PROBLEM — N99.3 PROLAPSE, POST-HYSTERECTOMY: Status: ACTIVE | Noted: 2020-12-17

## 2021-08-18 PROBLEM — M54.50 LOW BACK PAIN: Status: ACTIVE | Noted: 2017-11-30

## 2021-08-18 PROCEDURE — 87635 SARS-COV-2 COVID-19 AMP PRB: CPT | Performed by: NURSE PRACTITIONER

## 2021-10-14 RX ORDER — ESCITALOPRAM OXALATE 10 MG/1
10 TABLET ORAL DAILY
Qty: 30 TABLET | Refills: 2 | OUTPATIENT
Start: 2021-10-14

## 2021-10-14 RX ORDER — ESCITALOPRAM OXALATE 10 MG/1
10 TABLET ORAL DAILY
Qty: 30 TABLET | Refills: 2 | Status: SHIPPED | OUTPATIENT
Start: 2021-10-14 | End: 2022-01-14 | Stop reason: SDUPTHER

## 2021-11-08 RX ORDER — IBUPROFEN 800 MG/1
TABLET ORAL
Qty: 90 TABLET | Refills: 1 | Status: SHIPPED | OUTPATIENT
Start: 2021-11-08 | End: 2022-01-14 | Stop reason: SDUPTHER

## 2022-01-14 RX ORDER — ESCITALOPRAM OXALATE 10 MG/1
10 TABLET ORAL DAILY
Qty: 30 TABLET | Refills: 2 | Status: SHIPPED | OUTPATIENT
Start: 2022-01-14 | End: 2022-03-17

## 2022-01-14 RX ORDER — IBUPROFEN 800 MG/1
800 TABLET ORAL EVERY 8 HOURS PRN
Qty: 90 TABLET | Refills: 0 | Status: SHIPPED | OUTPATIENT
Start: 2022-01-14 | End: 2022-11-15 | Stop reason: SDUPTHER

## 2022-01-20 RX ORDER — ATORVASTATIN CALCIUM 40 MG/1
40 TABLET, FILM COATED ORAL DAILY
Qty: 90 TABLET | Refills: 11 | Status: SHIPPED | OUTPATIENT
Start: 2022-01-20 | End: 2023-01-26

## 2022-03-09 ENCOUNTER — OFFICE VISIT (OUTPATIENT)
Dept: FAMILY MEDICINE CLINIC | Facility: CLINIC | Age: 47
End: 2022-03-09

## 2022-03-09 VITALS
DIASTOLIC BLOOD PRESSURE: 96 MMHG | HEIGHT: 63 IN | OXYGEN SATURATION: 98 % | BODY MASS INDEX: 35.79 KG/M2 | WEIGHT: 202 LBS | SYSTOLIC BLOOD PRESSURE: 140 MMHG | HEART RATE: 69 BPM

## 2022-03-09 DIAGNOSIS — E11.65 TYPE 2 DIABETES MELLITUS WITH HYPERGLYCEMIA, WITHOUT LONG-TERM CURRENT USE OF INSULIN: ICD-10-CM

## 2022-03-09 DIAGNOSIS — E03.9 HYPOTHYROIDISM, UNSPECIFIED TYPE: ICD-10-CM

## 2022-03-09 DIAGNOSIS — R07.9 CHEST PAIN, UNSPECIFIED TYPE: ICD-10-CM

## 2022-03-09 DIAGNOSIS — E78.2 MIXED HYPERLIPIDEMIA: ICD-10-CM

## 2022-03-09 DIAGNOSIS — I10 HYPERTENSION, UNSPECIFIED TYPE: Primary | ICD-10-CM

## 2022-03-09 DIAGNOSIS — Z12.31 VISIT FOR SCREENING MAMMOGRAM: ICD-10-CM

## 2022-03-09 LAB
QT INTERVAL: 440 MS
QTC INTERVAL: 420 MS

## 2022-03-09 PROCEDURE — 99214 OFFICE O/P EST MOD 30 MIN: CPT | Performed by: NURSE PRACTITIONER

## 2022-03-09 PROCEDURE — 93010 ELECTROCARDIOGRAM REPORT: CPT | Performed by: INTERNAL MEDICINE

## 2022-03-09 PROCEDURE — 93005 ELECTROCARDIOGRAM TRACING: CPT | Performed by: NURSE PRACTITIONER

## 2022-03-09 RX ORDER — AMLODIPINE BESYLATE 2.5 MG/1
2.5 TABLET ORAL DAILY
Qty: 90 TABLET | Refills: 3 | Status: SHIPPED | OUTPATIENT
Start: 2022-03-09 | End: 2022-04-25

## 2022-03-09 NOTE — PROGRESS NOTES
Chief Complaint   Patient presents with   • Check Up     Left breast tender     Subjective   Karen Estrada is a 46 y.o. female.           Breast Pain  This is a new problem. The current episode started more than 1 month ago. The problem occurs daily. The problem has been gradually worsening. Associated symptoms include abdominal pain, chest pain, fatigue and nausea. Pertinent negatives include no arthralgias, chills, congestion, coughing, diaphoresis, fever, headaches, joint swelling, myalgias, neck pain, numbness, rash, sore throat, swollen glands, urinary symptoms, vertigo, visual change, vomiting or weakness. She has tried acetaminophen for the symptoms. The treatment provided no relief.   Hypertension  This is a chronic problem. The problem has been gradually worsening since onset. The problem is uncontrolled. Associated symptoms include chest pain and malaise/fatigue. Pertinent negatives include no headaches, neck pain, orthopnea, palpitations, peripheral edema, PND or shortness of breath. Risk factors for coronary artery disease include sedentary lifestyle, dyslipidemia and obesity. Past treatments include angiotensin blockers. Current antihypertension treatment includes angiotensin blockers. Compliance problems include diet.  There is no history of angina, kidney disease, CAD/MI, CVA, heart failure, left ventricular hypertrophy, PVD or retinopathy.   Hyperlipidemia  This is a chronic problem. The problem is uncontrolled. Exacerbating diseases include diabetes and hypothyroidism. Associated symptoms include chest pain. Pertinent negatives include no myalgias or shortness of breath. Current antihyperlipidemic treatment includes diet change and statins. The current treatment provides no improvement of lipids. Compliance problems include adherence to diet.    Hypothyroidism  This is a chronic problem. The current episode started more than 1 year ago. The problem occurs daily. The problem has been  resolved. Associated symptoms include abdominal pain, chest pain, fatigue and nausea. Pertinent negatives include no arthralgias, chills, congestion, coughing, diaphoresis, fever, headaches, joint swelling, myalgias, neck pain, numbness, rash, sore throat, swollen glands, urinary symptoms, vertigo, visual change, vomiting or weakness. Treatments tried: synthroid  The treatment provided significant relief.   Blood Sugar Problem  Associated symptoms include abdominal pain, chest pain, fatigue and nausea. Pertinent negatives include no arthralgias, chills, congestion, coughing, diaphoresis, fever, headaches, joint swelling, myalgias, neck pain, numbness, rash, sore throat, swollen glands, urinary symptoms, vertigo, visual change, vomiting or weakness.        The following portions of the patient's history were reviewed and updated as appropriate: allergies, current medications, past social history and problem list.    Review of Systems   Constitutional: Positive for activity change, fatigue and malaise/fatigue. Negative for appetite change, chills, diaphoresis, fever and unexpected weight change.   HENT: Negative.  Negative for congestion, dental problem, drooling, ear discharge, ear pain, facial swelling and sore throat.    Eyes: Negative.  Negative for photophobia, pain, discharge, redness, itching and visual disturbance.   Respiratory: Negative.  Negative for cough, choking, chest tightness, shortness of breath and wheezing.    Cardiovascular: Positive for chest pain. Negative for palpitations, orthopnea and PND.   Gastrointestinal: Positive for abdominal pain and nausea. Negative for constipation, diarrhea and vomiting.   Endocrine: Negative.  Negative for cold intolerance, heat intolerance, polydipsia, polyphagia and polyuria.   Genitourinary: Negative.  Negative for dysuria, flank pain, frequency and hematuria.   Musculoskeletal: Negative for arthralgias, back pain, gait problem, joint swelling, myalgias and neck  "pain.   Skin: Negative.  Negative for rash.   Allergic/Immunologic: Negative.    Neurological: Negative.  Negative for dizziness, vertigo, seizures, weakness, numbness and headaches.   Hematological: Negative.    Psychiatric/Behavioral: Negative for agitation and behavioral problems. The patient is nervous/anxious.        Objective   /96   Pulse 69   Ht 160 cm (63\")   Wt 91.6 kg (202 lb)   SpO2 98%   BMI 35.78 kg/m²   Physical Exam  Vitals and nursing note reviewed.   Constitutional:       Appearance: Normal appearance.   HENT:      Head: Normocephalic and atraumatic.      Right Ear: Tympanic membrane normal.      Left Ear: Tympanic membrane normal.      Nose: Nose normal.      Mouth/Throat:      Mouth: Mucous membranes are dry.   Eyes:      General: No scleral icterus.        Right eye: No discharge.         Left eye: No discharge.      Extraocular Movements: Extraocular movements intact.      Pupils: Pupils are equal, round, and reactive to light.   Cardiovascular:      Rate and Rhythm: Normal rate and regular rhythm.      Pulses: Normal pulses.      Heart sounds: Normal heart sounds. No murmur heard.    No friction rub. No gallop.   Pulmonary:      Effort: Pulmonary effort is normal.      Breath sounds: Normal breath sounds. No stridor. No wheezing, rhonchi or rales.   Chest:      Chest wall: No tenderness.      Comments: Reports breast pain but no lumps palpable-thickened breast tissue but no lumps   Abdominal:      General: Abdomen is flat.      Palpations: There is no mass.      Tenderness: There is abdominal tenderness.      Hernia: No hernia is present.   Musculoskeletal:      Cervical back: Normal range of motion and neck supple.   Skin:     General: Skin is warm.   Neurological:      General: No focal deficit present.      Mental Status: She is alert and oriented to person, place, and time.      Cranial Nerves: No cranial nerve deficit.      Sensory: No sensory deficit.      Motor: No weakness. "      Coordination: Coordination normal.      Gait: Gait normal.      Deep Tendon Reflexes: Reflexes normal.   Psychiatric:         Mood and Affect: Mood normal.         Behavior: Behavior normal.              Assessment/Plan     Problems Addressed this Visit        Cardiac and Vasculature    Hypertension - Primary    Relevant Medications    amLODIPine (Norvasc) 2.5 MG tablet    Other Relevant Orders    Iron (Completed)    Vitamin D 25 Hydroxy (Completed)    Vitamin B12 (Completed)    TSH (Completed)    Magnesium (Completed)    Chest pain    Relevant Orders    ECG 12 Lead (Completed)    Mixed hyperlipidemia      Other Visit Diagnoses     Hypothyroidism, unspecified type        Relevant Orders    Iron (Completed)    Vitamin D 25 Hydroxy (Completed)    Vitamin B12 (Completed)    TSH (Completed)    Magnesium (Completed)    Visit for screening mammogram        Relevant Orders    Mammo Screening Digital Tomosynthesis Bilateral With CAD (Completed)    Type 2 diabetes mellitus with hyperglycemia, without long-term current use of insulin (HCC)        Relevant Medications    Semaglutide,0.25 or 0.5MG/DOS, (Ozempic, 0.25 or 0.5 MG/DOSE,) 2 MG/1.5ML solution pen-injector    Other Relevant Orders    Hemoglobin A1c      Diagnoses       Codes Comments    Hypertension, unspecified type    -  Primary ICD-10-CM: I10  ICD-9-CM: 401.9     Chest pain, unspecified type     ICD-10-CM: R07.9  ICD-9-CM: 786.50     Mixed hyperlipidemia     ICD-10-CM: E78.2  ICD-9-CM: 272.2     Hypothyroidism, unspecified type     ICD-10-CM: E03.9  ICD-9-CM: 244.9     Visit for screening mammogram     ICD-10-CM: Z12.31  ICD-9-CM: V76.12     Type 2 diabetes mellitus with hyperglycemia, without long-term current use of insulin (HCC)     ICD-10-CM: E11.65  ICD-9-CM: 250.00, 790.29            New Medications Ordered This Visit   Medications   • amLODIPine (Norvasc) 2.5 MG tablet     Sig: Take 1 tablet by mouth Daily.     Dispense:  90 tablet     Refill:  3   •  Semaglutide,0.25 or 0.5MG/DOS, (Ozempic, 0.25 or 0.5 MG/DOSE,) 2 MG/1.5ML solution pen-injector     Sig: Inject 0.25 mg under the skin into the appropriate area as directed 1 (One) Time Per Week.     Dispense:  2 mL     Refill:  11   • ondansetron ODT (Zofran ODT) 4 MG disintegrating tablet     Sig: Place 1 tablet on the tongue Every 8 (Eight) Hours As Needed for Nausea or Vomiting.     Dispense:  15 tablet     Refill:  5     Current Outpatient Medications on File Prior to Visit   Medication Sig Dispense Refill   • albuterol sulfate HFA (VENTOLIN HFA) 108 (90 Base) MCG/ACT inhaler Inhale 2 puffs Every 4 (Four) Hours As Needed for Wheezing. 1 inhaler 0   • atorvastatin (LIPITOR) 40 MG tablet TAKE 1 TABLET BY MOUTH DAILY. 90 tablet 11   • baclofen (LIORESAL) 10 MG tablet Take 1 tablet by mouth 3 (Three) Times a Day. 30 tablet 5   • busPIRone (BUSPAR) 10 MG tablet Tid prn 90 tablet 11   • cyclobenzaprine (FLEXERIL) 10 MG tablet Take 1 tablet by mouth 3 (Three) Times a Day As Needed for Muscle Spasms. 12 tablet 0   • escitalopram (LEXAPRO) 10 MG tablet Take 1 tablet by mouth Daily. 30 tablet 2   • glucose blood test strip Use to test BS daily 100 each 12   • glucose monitor monitoring kit Use to test BS daily 1 each 0   • hydroCHLOROthiazide (HYDRODIURIL) 12.5 MG tablet Daily as needed 90 tablet 3   • ibuprofen (ADVIL,MOTRIN) 800 MG tablet Take 1 tablet by mouth Every 8 (Eight) Hours As Needed for Mild Pain  or Moderate Pain . for pain 90 tablet 0   • Lancets misc Use to test BS daily 100 each 11   • levothyroxine (Synthroid) 75 MCG tablet Take 1 tablet by mouth Daily. 30 tablet 11   • losartan (Cozaar) 100 MG tablet Take 1 tablet by mouth Daily. 90 tablet 3   • meloxicam (MOBIC) 7.5 MG tablet TAKE 1 TO 2 TABLETS BY MOUTH DAILY 60 tablet 5   • metFORMIN (Glucophage) 500 MG tablet Take 1 tablet by mouth 2 (Two) Times a Day With Meals. 30 tablet 11     No current facility-administered medications on file prior to visit.        25 minutes   Follow Up   Return in about 6 months (around 9/9/2022) for Next scheduled follow up.     Answers for HPI/ROS submitted by the patient on 3/8/2022  Please describe your symptoms.: Pain in left breast  Have you had these symptoms before?: No  How long have you been having these symptoms?: Greater than 2 weeks  Please list any medications you are currently taking for this condition.: Ibuprofen  What is the primary reason for your visit?: Other    Add norvasc 2.5mg daily -see back in 6 months, mammogram tomorrow, monitor bp-diet discussed, labs today     ekg normal -georges, mammogram as directed, labs as directed, continue current meds -see back as directed

## 2022-03-11 ENCOUNTER — LAB (OUTPATIENT)
Dept: LAB | Facility: HOSPITAL | Age: 47
End: 2022-03-11

## 2022-03-11 PROCEDURE — 36415 COLL VENOUS BLD VENIPUNCTURE: CPT | Performed by: NURSE PRACTITIONER

## 2022-03-11 PROCEDURE — 82607 VITAMIN B-12: CPT | Performed by: NURSE PRACTITIONER

## 2022-03-11 PROCEDURE — 80053 COMPREHEN METABOLIC PANEL: CPT | Performed by: NURSE PRACTITIONER

## 2022-03-11 PROCEDURE — 83735 ASSAY OF MAGNESIUM: CPT | Performed by: NURSE PRACTITIONER

## 2022-03-11 PROCEDURE — 83036 HEMOGLOBIN GLYCOSYLATED A1C: CPT | Performed by: NURSE PRACTITIONER

## 2022-03-11 PROCEDURE — 84443 ASSAY THYROID STIM HORMONE: CPT | Performed by: NURSE PRACTITIONER

## 2022-03-11 PROCEDURE — 85025 COMPLETE CBC W/AUTO DIFF WBC: CPT | Performed by: NURSE PRACTITIONER

## 2022-03-11 PROCEDURE — 83540 ASSAY OF IRON: CPT | Performed by: NURSE PRACTITIONER

## 2022-03-11 PROCEDURE — 82306 VITAMIN D 25 HYDROXY: CPT | Performed by: NURSE PRACTITIONER

## 2022-03-11 PROCEDURE — 80061 LIPID PANEL: CPT | Performed by: NURSE PRACTITIONER

## 2022-03-12 LAB
25(OH)D3 SERPL-MCNC: 21.3 NG/ML (ref 30–100)
ALBUMIN SERPL-MCNC: 4.6 G/DL (ref 3.5–5.2)
ALBUMIN/GLOB SERPL: 1.6 G/DL
ALP SERPL-CCNC: 113 U/L (ref 39–117)
ALT SERPL W P-5'-P-CCNC: 32 U/L (ref 1–33)
ANION GAP SERPL CALCULATED.3IONS-SCNC: 12 MMOL/L (ref 5–15)
AST SERPL-CCNC: 25 U/L (ref 1–32)
BASOPHILS # BLD AUTO: 0.06 10*3/MM3 (ref 0–0.2)
BASOPHILS NFR BLD AUTO: 0.7 % (ref 0–1.5)
BILIRUB SERPL-MCNC: 0.3 MG/DL (ref 0–1.2)
BUN SERPL-MCNC: 9 MG/DL (ref 6–20)
BUN/CREAT SERPL: 13.6 (ref 7–25)
CALCIUM SPEC-SCNC: 9.7 MG/DL (ref 8.6–10.5)
CHLORIDE SERPL-SCNC: 102 MMOL/L (ref 98–107)
CHOLEST SERPL-MCNC: 179 MG/DL (ref 0–200)
CO2 SERPL-SCNC: 27 MMOL/L (ref 22–29)
CREAT SERPL-MCNC: 0.66 MG/DL (ref 0.57–1)
DEPRECATED RDW RBC AUTO: 39.1 FL (ref 37–54)
EGFRCR SERPLBLD CKD-EPI 2021: 108.4 ML/MIN/1.73
EOSINOPHIL # BLD AUTO: 0.27 10*3/MM3 (ref 0–0.4)
EOSINOPHIL NFR BLD AUTO: 3 % (ref 0.3–6.2)
ERYTHROCYTE [DISTWIDTH] IN BLOOD BY AUTOMATED COUNT: 13 % (ref 12.3–15.4)
GLOBULIN UR ELPH-MCNC: 2.9 GM/DL
GLUCOSE SERPL-MCNC: 115 MG/DL (ref 65–99)
HBA1C MFR BLD: 6.8 % (ref 4.8–5.6)
HCT VFR BLD AUTO: 37.1 % (ref 34–46.6)
HDLC SERPL-MCNC: 43 MG/DL (ref 40–60)
HGB BLD-MCNC: 12.3 G/DL (ref 12–15.9)
IMM GRANULOCYTES # BLD AUTO: 0.04 10*3/MM3 (ref 0–0.05)
IMM GRANULOCYTES NFR BLD AUTO: 0.4 % (ref 0–0.5)
IRON 24H UR-MRATE: 71 MCG/DL (ref 37–145)
LDLC SERPL CALC-MCNC: 100 MG/DL (ref 0–100)
LDLC/HDLC SERPL: 2.19 {RATIO}
LYMPHOCYTES # BLD AUTO: 3.25 10*3/MM3 (ref 0.7–3.1)
LYMPHOCYTES NFR BLD AUTO: 35.8 % (ref 19.6–45.3)
MAGNESIUM SERPL-MCNC: 2.2 MG/DL (ref 1.6–2.6)
MCH RBC QN AUTO: 27.7 PG (ref 26.6–33)
MCHC RBC AUTO-ENTMCNC: 33.2 G/DL (ref 31.5–35.7)
MCV RBC AUTO: 83.6 FL (ref 79–97)
MONOCYTES # BLD AUTO: 0.72 10*3/MM3 (ref 0.1–0.9)
MONOCYTES NFR BLD AUTO: 7.9 % (ref 5–12)
NEUTROPHILS NFR BLD AUTO: 4.73 10*3/MM3 (ref 1.7–7)
NEUTROPHILS NFR BLD AUTO: 52.2 % (ref 42.7–76)
NRBC BLD AUTO-RTO: 0 /100 WBC (ref 0–0.2)
PLATELET # BLD AUTO: 353 10*3/MM3 (ref 140–450)
PMV BLD AUTO: 10.4 FL (ref 6–12)
POTASSIUM SERPL-SCNC: 3.6 MMOL/L (ref 3.5–5.2)
PROT SERPL-MCNC: 7.5 G/DL (ref 6–8.5)
RBC # BLD AUTO: 4.44 10*6/MM3 (ref 3.77–5.28)
SODIUM SERPL-SCNC: 141 MMOL/L (ref 136–145)
TRIGL SERPL-MCNC: 209 MG/DL (ref 0–150)
TSH SERPL DL<=0.05 MIU/L-ACNC: 1.21 UIU/ML (ref 0.27–4.2)
VIT B12 BLD-MCNC: 420 PG/ML (ref 211–946)
VLDLC SERPL-MCNC: 36 MG/DL (ref 5–40)
WBC NRBC COR # BLD: 9.07 10*3/MM3 (ref 3.4–10.8)

## 2022-03-14 RX ORDER — ONDANSETRON 4 MG/1
4 TABLET, ORALLY DISINTEGRATING ORAL EVERY 8 HOURS PRN
Qty: 15 TABLET | Refills: 5 | Status: SHIPPED | OUTPATIENT
Start: 2022-03-14 | End: 2022-04-25

## 2022-03-14 RX ORDER — SEMAGLUTIDE 1.34 MG/ML
0.25 INJECTION, SOLUTION SUBCUTANEOUS WEEKLY
Qty: 2 ML | Refills: 11 | Status: SHIPPED | OUTPATIENT
Start: 2022-03-14 | End: 2023-02-08

## 2022-03-17 RX ORDER — ESCITALOPRAM OXALATE 10 MG/1
TABLET ORAL
Qty: 30 TABLET | Refills: 11 | Status: SHIPPED | OUTPATIENT
Start: 2022-03-17 | End: 2022-04-14 | Stop reason: SDUPTHER

## 2022-04-15 RX ORDER — ESCITALOPRAM OXALATE 10 MG/1
10 TABLET ORAL DAILY
Qty: 30 TABLET | Refills: 11 | Status: SHIPPED | OUTPATIENT
Start: 2022-04-15

## 2022-04-22 DIAGNOSIS — M25.561 ACUTE PAIN OF RIGHT KNEE: Primary | ICD-10-CM

## 2022-04-25 ENCOUNTER — OFFICE VISIT (OUTPATIENT)
Dept: ORTHOPEDIC SURGERY | Facility: CLINIC | Age: 47
End: 2022-04-25

## 2022-04-25 ENCOUNTER — HOSPITAL ENCOUNTER (OUTPATIENT)
Dept: ULTRASOUND IMAGING | Facility: HOSPITAL | Age: 47
Discharge: HOME | End: 2022-04-25
Admitting: NURSE PRACTITIONER

## 2022-04-25 VITALS — HEIGHT: 63 IN | WEIGHT: 194 LBS | BODY MASS INDEX: 34.38 KG/M2

## 2022-04-25 DIAGNOSIS — M25.561 ACUTE PAIN OF RIGHT KNEE: Primary | ICD-10-CM

## 2022-04-25 DIAGNOSIS — M25.561 ACUTE PAIN OF RIGHT KNEE: ICD-10-CM

## 2022-04-25 DIAGNOSIS — M79.89 LEG SWELLING: ICD-10-CM

## 2022-04-25 PROCEDURE — 99213 OFFICE O/P EST LOW 20 MIN: CPT | Performed by: NURSE PRACTITIONER

## 2022-04-25 PROCEDURE — 93971 EXTREMITY STUDY: CPT

## 2022-04-25 NOTE — PROGRESS NOTES
Karen Estrada is a 46 y.o. female   Primary provider:  Luci Rodriguez APRN       Chief Complaint   Patient presents with   • Right Knee - Pain       HISTORY OF PRESENT ILLNESS:    The patient is a 46-year-old female who presents today with complaints of right leg swelling.  Patient reports 3 weeks ago she began having swelling in her right leg that extended from her right knee to her right ankle.  She reports pain in calf.  She states swelling started abruptly.  After swelling started she began having some popping in the knee with mild discomfort.  She cannot tell if this is pain in her knee or secondary to stiffness associated with swelling.  She reports pain in knee as moderate to grinding and aching.  Standing seems to aggravate symptoms.  She has tried compression and NSAIDs without relief of symptoms.  She has no complaints of fever, nausea, vomiting, burning, tingling, numbness.  She is a non-smoker.    Pain  This is a new problem. The current episode started 1 to 4 weeks ago. The problem occurs constantly. The problem has been gradually worsening. Associated symptoms include arthralgias and joint swelling. The symptoms are aggravated by standing. She has tried NSAIDs for the symptoms. The treatment provided mild relief.        CONCURRENT MEDICAL HISTORY:    Past Medical History:   Diagnosis Date   • Anxiety    • Backache     radiation to legs-abnormal MRI   • Benign hypertension    • Breast cyst    • Breast lump     left breast   • Chest pain    • Cough    • Degenerative joint disease involving multiple joints    • Depression    • Epigastric pain    • Essential hypertension    • Fatigue    • Fatty infiltration of liver 7/7/2021   • Functional heart murmur    • Gestational diabetes mellitus    • History of transfusion    • Hypertensive disorder    • Intrinsic asthma with status asthmaticus    • Low back pain    • Lumbar disc lesion    • Lymphadenopathy    • Malaise and fatigue    • Migraine     • Mixed hyperlipidemia 7/7/2021   • Neck pain     throat pain   • Non-IgE mediated allergic asthma    • Noncompliance with treatment    • Overweight    • Plantar fascial fibromatosis    • Plantar fasciitis of right foot    • Soft tissue swelling     right lateral chest wall   • Surgery follow-up    • Upper respiratory infection    • Wheezing        Allergies   Allergen Reactions   • Promethazine Itching   • Adhesive Tape Swelling and Rash         Current Outpatient Medications:   •  albuterol sulfate HFA (VENTOLIN HFA) 108 (90 Base) MCG/ACT inhaler, Inhale 2 puffs Every 4 (Four) Hours As Needed for Wheezing., Disp: 1 inhaler, Rfl: 0  •  atorvastatin (LIPITOR) 40 MG tablet, TAKE 1 TABLET BY MOUTH DAILY., Disp: 90 tablet, Rfl: 11  •  busPIRone (BUSPAR) 10 MG tablet, Tid prn, Disp: 90 tablet, Rfl: 11  •  escitalopram (LEXAPRO) 10 MG tablet, Take 1 tablet by mouth Daily., Disp: 30 tablet, Rfl: 11  •  glucose blood test strip, Use to test BS daily, Disp: 100 each, Rfl: 12  •  glucose monitor monitoring kit, Use to test BS daily, Disp: 1 each, Rfl: 0  •  hydroCHLOROthiazide (HYDRODIURIL) 12.5 MG tablet, Daily as needed, Disp: 90 tablet, Rfl: 3  •  ibuprofen (ADVIL,MOTRIN) 800 MG tablet, Take 1 tablet by mouth Every 8 (Eight) Hours As Needed for Mild Pain  or Moderate Pain . for pain, Disp: 90 tablet, Rfl: 0  •  Lancets misc, Use to test BS daily, Disp: 100 each, Rfl: 11  •  levothyroxine (Synthroid) 75 MCG tablet, Take 1 tablet by mouth Daily., Disp: 30 tablet, Rfl: 11  •  losartan (Cozaar) 100 MG tablet, Take 1 tablet by mouth Daily., Disp: 90 tablet, Rfl: 3  •  meloxicam (MOBIC) 7.5 MG tablet, TAKE 1 TO 2 TABLETS BY MOUTH DAILY, Disp: 60 tablet, Rfl: 5  •  metFORMIN (Glucophage) 500 MG tablet, Take 1 tablet by mouth 2 (Two) Times a Day With Meals., Disp: 30 tablet, Rfl: 11  •  Semaglutide,0.25 or 0.5MG/DOS, (Ozempic, 0.25 or 0.5 MG/DOSE,) 2 MG/1.5ML solution pen-injector, Inject 0.25 mg under the skin into the  "appropriate area as directed 1 (One) Time Per Week., Disp: 2 mL, Rfl: 11    Past Surgical History:   Procedure Laterality Date   • BREAST BIOPSY  1998    left mass, fibrosis, mild   •  SECTION     • COLONOSCOPY  2005    normal colonoscopic exam.  Irritable bowel   • DILATATION AND CURETTAGE  1998    incomplete    • FOOT SURGERY  2015    partial plantar fasciectomy, right foot   • HYSTERECTOMY  2007    menorrhagia; still has both ovaries   • INJECTION OF MEDICATION  2014    dexamethasone (1)   • INJECTION OF MEDICATION  2014    inj Tend-Sheath Ligament  (3)   • INJECTION OF MEDICATION  2014    Kenalog (4)   • INJECTION OF MEDICATION  2011    Toradol (1)   • PAP SMEAR  10/15/1996    normal   • TUBAL ABDOMINAL LIGATION     • VAGINAL DELIVERY      x3       Family History   Problem Relation Age of Onset   • Breast cancer Sister        Social History     Socioeconomic History   • Marital status:    Tobacco Use   • Smoking status: Never Smoker   • Smokeless tobacco: Never Used   Vaping Use   • Vaping Use: Never used   Substance and Sexual Activity   • Alcohol use: No   • Drug use: No   • Sexual activity: Yes     Partners: Male     Birth control/protection: Surgical        Review of Systems   Constitutional: Negative.    HENT: Negative.    Eyes: Negative.    Respiratory: Negative.    Cardiovascular: Negative.    Gastrointestinal: Negative.    Endocrine: Negative.    Genitourinary: Negative.    Musculoskeletal: Positive for arthralgias and joint swelling.        Leg swelling   Skin: Negative.    Neurological: Negative.    Hematological: Negative.    Psychiatric/Behavioral:        Anxiety/Depression       PHYSICAL EXAMINATION:       Ht 160 cm (63\")   Wt 88 kg (194 lb)   BMI 34.37 kg/m²     Physical Exam  Vitals and nursing note reviewed.   Constitutional:       General: She is not in acute distress.     Appearance: She is well-developed. She is " not toxic-appearing.   HENT:      Head: Normocephalic.   Pulmonary:      Effort: Pulmonary effort is normal. No respiratory distress.   Musculoskeletal:      Right knee: No effusion.      Instability Tests: Medial Tapan test negative and lateral Tapan test negative.   Skin:     General: Skin is warm and dry.   Neurological:      Mental Status: She is alert and oriented to person, place, and time.   Psychiatric:         Behavior: Behavior normal.         Thought Content: Thought content normal.         Judgment: Judgment normal.         GAIT:     []  Normal  []  Antalgic    Assistive device: []  None  []  Walker     []  Crutches  []  Cane     []  Wheelchair  []  Stretcher    Right Knee Exam     Tenderness   Right knee tenderness location: Calf.    Range of Motion   Extension: 0   Flexion: 120     Tests   Tapan:  Medial - negative Lateral - negative  Varus: negative Valgus: negative  Drawer:  Anterior - negative    Posterior - negative    Other   Erythema: absent  Pulse: present  Swelling: mild  Effusion: no effusion present    Comments:  Mild swelling.  No evidence of infection.  Distal pulses intact  Negative Homans' sign                  XR Knee 4+ View Right    Result Date: 4/14/2022  Narrative: PROCEDURE: Right knee x-rays with five views. INDICATION: pain, swelling, decreased range of motion, M25.561 Pain in right knee M25.461 Effusion, right knee COMPARISON: None. FINDINGS: No joint effusion or soft tissue abnormality. No old or acute fractures or acute osseous abnormality. Mild medial joint space narrowing.     Impression: Mild medial joint space narrowing. No acute osseous abnormality. [id number] Electronically signed by:  Connor Perez MD  4/14/2022 4:00 PM CDT Workstation: [id number]          ASSESSMENT:    Diagnoses and all orders for this visit:    Acute pain of right knee    Leg swelling  -     US Venous Doppler Lower Extremity Left (duplex); Future          PLAN      Patient reports sudden leg  swelling that has persisted past 3 weeks and is not associated with injury.  Patient is having some knee pain and decreased range of motion today, however this came subsequently after swelling.  Negative Homans' sign on exam however we will rule out DVT with ultrasound today.  Patient has failed conservative management including rice therapy, bracing, activity modification.  Patient is not having mechanical symptoms and has negative Tapan testing today.  No evidence of septic joint.  As long as ultrasound returns negative patient will return to see me in office and we may consider a intra-articular injection, if this is ineffective in controlling pain and does not resolve symptoms, patient states she would then desire to proceed with MRI.    Patient sent directly from clinic to hospital for ultrasound.    Return for US results .    EMR Dragon/Transciption Disclaimer: Some of this note may be an electronic transcription/translation of spoken language to printed text.  The electronic translation of spoken language may permit erroneous, or at times, nonsensical words or phrases to be inadvertently transcribed. Although I have reviewed the note for such errors, some may still exist.       This document has been electronically signed by MERARI Naqvi on April 25, 2022 16:16 CDT

## 2022-04-26 NOTE — PROGRESS NOTES
Please let patient know US did not show blood clot but did show Bakers Cyst, this is common and can resolve on its own but can be very painful. Sometimes a knee injection can help. Please call her and schedule her for injection, thank you.

## 2022-04-28 ENCOUNTER — OFFICE VISIT (OUTPATIENT)
Dept: ORTHOPEDIC SURGERY | Facility: CLINIC | Age: 47
End: 2022-04-28

## 2022-04-28 VITALS — WEIGHT: 194 LBS | BODY MASS INDEX: 34.38 KG/M2 | HEIGHT: 63 IN

## 2022-04-28 DIAGNOSIS — M66.0 BAKER'S CYST, RUPTURED: ICD-10-CM

## 2022-04-28 DIAGNOSIS — M25.561 ACUTE PAIN OF RIGHT KNEE: Primary | ICD-10-CM

## 2022-04-28 PROCEDURE — 20610 DRAIN/INJ JOINT/BURSA W/O US: CPT | Performed by: NURSE PRACTITIONER

## 2022-04-28 PROCEDURE — 99213 OFFICE O/P EST LOW 20 MIN: CPT | Performed by: NURSE PRACTITIONER

## 2022-04-28 RX ORDER — BUPIVACAINE HYDROCHLORIDE 5 MG/ML
2 INJECTION, SOLUTION PERINEURAL
Status: COMPLETED | OUTPATIENT
Start: 2022-04-28 | End: 2022-04-28

## 2022-04-28 RX ORDER — TRIAMCINOLONE ACETONIDE 40 MG/ML
40 INJECTION, SUSPENSION INTRA-ARTICULAR; INTRAMUSCULAR
Status: COMPLETED | OUTPATIENT
Start: 2022-04-28 | End: 2022-04-28

## 2022-04-28 RX ADMIN — TRIAMCINOLONE ACETONIDE 40 MG: 40 INJECTION, SUSPENSION INTRA-ARTICULAR; INTRAMUSCULAR at 15:44

## 2022-04-28 RX ADMIN — BUPIVACAINE HYDROCHLORIDE 2 ML: 5 INJECTION, SOLUTION PERINEURAL at 15:44

## 2022-04-28 NOTE — PROGRESS NOTES
"Karen Estrada is a 46 y.o. female returns for     Chief Complaint   Patient presents with   • Right Knee - Follow-up       HISTORY OF PRESENT ILLNESS:    Patient returns today to follow-up on knee pain after having ultrasound which showed a partial rupture of a Baker's cyst but no DVT.  Patient reports 3 weeks ago she began having swelling in her right leg that extended from her right knee to her right ankle.  She reports continued pain in calf and knee.  She continues to have swelling.  She reports continued popping in her knee since last being seen.  She reports pain in knee as moderate to grinding and aching.  Standing seems to aggravate symptoms.  She has tried compression and NSAIDs without relief of symptoms.  She has no complaints of fever, nausea, vomiting, burning, tingling, numbness.    CONCURRENT MEDICAL HISTORY:    The following portions of the patient's history were reviewed and updated as appropriate: allergies, current medications, past family history, past medical history, past social history, past surgical history and problem list.     ROS  No fevers or chills.  No chest pain or shortness of air.  No GI or  disturbances.  Right knee pain/swelling.    PHYSICAL EXAMINATION:       Ht 160 cm (63\")   Wt 88 kg (194 lb)   BMI 34.37 kg/m²     Physical Exam  Vitals and nursing note reviewed.   Constitutional:       General: She is not in acute distress.     Appearance: She is well-developed. She is not toxic-appearing.   HENT:      Head: Normocephalic.   Pulmonary:      Effort: Pulmonary effort is normal. No respiratory distress.   Musculoskeletal:      Right knee: No effusion.      Instability Tests: Medial Tapan test negative and lateral Tapan test negative.   Skin:     General: Skin is warm and dry.   Neurological:      Mental Status: She is alert and oriented to person, place, and time.   Psychiatric:         Behavior: Behavior normal.         Thought Content: Thought content normal.   "       Judgment: Judgment normal.         GAIT:     []  Normal  [x]  Antalgic    Assistive device: [x]  None  []  Walker     []  Crutches  []  Cane     []  Wheelchair  []  Stretcher    Right Knee Exam     Tenderness   Right knee tenderness location: Calf.    Range of Motion   Extension: 0   Flexion: 120     Tests   Tapan:  Medial - negative Lateral - negative  Varus: negative Valgus: negative  Drawer:  Anterior - negative    Posterior - negative    Other   Erythema: absent  Pulse: present  Swelling: mild  Effusion: no effusion present    Comments:  Mild swelling.  No evidence of infection.  Distal pulses intact                XR Knee 4+ View Right    Result Date: 4/14/2022  Narrative: PROCEDURE: Right knee x-rays with five views. INDICATION: pain, swelling, decreased range of motion, M25.561 Pain in right knee M25.461 Effusion, right knee COMPARISON: None. FINDINGS: No joint effusion or soft tissue abnormality. No old or acute fractures or acute osseous abnormality. Mild medial joint space narrowing.     Impression: Mild medial joint space narrowing. No acute osseous abnormality. [id number] Electronically signed by:  Connor Perez MD  4/14/2022 4:00 PM Mamina ShkolaT Workstation: [id number]    XR Knee Bilateral AP Standing    Result Date: 4/25/2022  Narrative: Standing AP knees HISTORY: Pain. Right knee pain. AP standing view of each knee obtained. COMPARISON: Right knee April 14, 2022 FINDINGS: No fracture or dislocation as viewed in the AP projection. Minimal to moderate narrowing of each medial joint space. No other osseous or articular abnormality.     Impression: CONCLUSION: Minimal to moderate narrowing of each medial joint space. [id number] Electronically signed by:  Theodore Carmichael MD  4/25/2022 8:48 PM Mamina ShkolaT Workstation: [id number]    US Venous Doppler Lower Extremity Right (duplex)    Result Date: 4/25/2022  Narrative: Ultrasound venous duplex right lower extremity HISTORY: Right calf pain and swelling. Right knee pain. Duplex  ultrasound of the deep venous system of the right lower extremity was performed FINDINGS: Real-time images demonstrate normal compressibility without evidence of intraluminal thrombus. Doppler shows phasic flow and augmentation. Color Doppler also reveals venous patency. 5.72 cm x 2.26 cm x 1.91 cm hemorrhagic, infected or partially ruptured popliteal cyst.     Impression: CONCLUSION: No ultrasound evidence of deep venous thrombosis of the right lower extremity. 5.72 cm x 2.26 cm x 1.91 cm hemorrhagic, infected or partially ruptured popliteal cyst. [id number] Electronically signed by:  Theodore Carmichael MD  4/25/2022 4:45 PM CDT Workstation: [id number]            ASSESSMENT:    Diagnoses and all orders for this visit:    Acute pain of right knee    Baker's cyst, ruptured    Other orders  -     Large Joint Arthrocentesis: R knee          PLAN  Large Joint Arthrocentesis: R knee  Date/Time: 4/28/2022 3:44 PM  Consent given by: patient  Site marked: site marked  Timeout: Immediately prior to procedure a time out was called to verify the correct patient, procedure, equipment, support staff and site/side marked as required   Supporting Documentation  Indications: pain   Procedure Details  Location: knee - R knee  Preparation: Patient was prepped and draped in the usual sterile fashion  Needle size: 22 G  Medications administered: 2 mL bupivacaine 0.5 %; 40 mg triamcinolone acetonide 40 MG/ML  Patient tolerance: patient tolerated the procedure well with no immediate complications            Patient continues to have knee pain and swelling.  After discussing available treatment options, including risk, benefits, alternatives, patient desires to proceed with intra-articular injection today.  Patient tolerated injection well.  Patient instructed to call office in 2 to 4 weeks if not feeling better.  If pain persists despite intra-articular injection, OTC medications, activity modification, we will proceed with MRI.  Patient instructed  that she may call for MRI order rather than being seen in office since we had discussion today.    Recommend the following:    -Rest and activity modification as tolerated and based on pain.  -Modified weightbearing of the affected extremity with use of a cane or walker as needed.  -Gradual progression of weightbearing and activity as pain and swelling allow.  -Conditioning and strengthening exercises of the bilateral knees/legs.  -Elevation and ice therapy to the affected knee to minimize pain/swelling/inflammation.   -Tylenol, Aleve or Ibuprofen as needed for pain/discomfort.          Return in about 4 weeks (around 5/26/2022), or if symptoms worsen or fail to improve.      EMR Dragon/Transciption Disclaimer: Some of this note may be an electronic transcription/translation of spoken language to printed text.  The electronic translation of spoken language may permit erroneous, or at times, nonsensical words or phrases to be inadvertently transcribed. Although I have reviewed the note for such errors, some may still exist.       This document has been electronically signed by MERARI Naqvi on April 28, 2022 22:09 CDT

## 2022-07-28 DIAGNOSIS — R10.13 EPIGASTRIC PAIN: ICD-10-CM

## 2022-07-28 DIAGNOSIS — R07.9 CHEST PAIN, UNSPECIFIED TYPE: ICD-10-CM

## 2022-07-28 DIAGNOSIS — I10 HYPERTENSION, UNSPECIFIED TYPE: ICD-10-CM

## 2022-07-28 RX ORDER — LOSARTAN POTASSIUM 100 MG/1
TABLET ORAL
Qty: 90 TABLET | Refills: 2 | Status: SHIPPED | OUTPATIENT
Start: 2022-07-28

## 2022-07-28 RX ORDER — LEVOTHYROXINE SODIUM 0.07 MG/1
TABLET ORAL
Qty: 30 TABLET | Refills: 2 | Status: SHIPPED | OUTPATIENT
Start: 2022-07-28 | End: 2022-10-27

## 2022-09-22 DIAGNOSIS — I10 HYPERTENSION, UNSPECIFIED TYPE: ICD-10-CM

## 2022-09-22 DIAGNOSIS — R07.9 CHEST PAIN, UNSPECIFIED TYPE: ICD-10-CM

## 2022-09-22 DIAGNOSIS — R10.13 EPIGASTRIC PAIN: ICD-10-CM

## 2022-10-27 DIAGNOSIS — I10 HYPERTENSION, UNSPECIFIED TYPE: ICD-10-CM

## 2022-10-27 DIAGNOSIS — R07.9 CHEST PAIN, UNSPECIFIED TYPE: ICD-10-CM

## 2022-10-27 DIAGNOSIS — R10.13 EPIGASTRIC PAIN: ICD-10-CM

## 2022-10-27 RX ORDER — LEVOTHYROXINE SODIUM 0.07 MG/1
TABLET ORAL
Qty: 30 TABLET | Refills: 2 | Status: SHIPPED | OUTPATIENT
Start: 2022-10-27 | End: 2023-01-26

## 2022-11-03 ENCOUNTER — OFFICE VISIT (OUTPATIENT)
Dept: OBSTETRICS AND GYNECOLOGY | Facility: CLINIC | Age: 47
End: 2022-11-03

## 2022-11-03 VITALS
DIASTOLIC BLOOD PRESSURE: 80 MMHG | SYSTOLIC BLOOD PRESSURE: 122 MMHG | BODY MASS INDEX: 30.12 KG/M2 | WEIGHT: 170 LBS | HEIGHT: 63 IN

## 2022-11-03 DIAGNOSIS — N93.9 VAGINAL BLEEDING: ICD-10-CM

## 2022-11-03 DIAGNOSIS — N94.10 FEMALE DYSPAREUNIA: ICD-10-CM

## 2022-11-03 DIAGNOSIS — R10.2 PELVIC PAIN: Primary | ICD-10-CM

## 2022-11-03 LAB
CANDIDA ALBICANS: NEGATIVE
GARDNERELLA VAGINALIS: NEGATIVE
T VAGINALIS DNA VAG QL PROBE+SIG AMP: NEGATIVE

## 2022-11-03 PROCEDURE — 87510 GARDNER VAG DNA DIR PROBE: CPT

## 2022-11-03 PROCEDURE — 87660 TRICHOMONAS VAGIN DIR PROBE: CPT

## 2022-11-03 PROCEDURE — 87480 CANDIDA DNA DIR PROBE: CPT

## 2022-11-03 PROCEDURE — 99213 OFFICE O/P EST LOW 20 MIN: CPT | Performed by: NURSE PRACTITIONER

## 2022-11-03 RX ORDER — AMLODIPINE BESYLATE 2.5 MG/1
2.5 TABLET ORAL DAILY
COMMUNITY
Start: 2022-10-27 | End: 2023-01-26

## 2022-11-03 NOTE — PROGRESS NOTES
Subjective   Karen Estrada is a 47 y.o. Vaginal bleeding     History of Present Illness  Hysterectomy 2007, still has ovaries  Bladder sling with mesh 2020    Patient presents today with a 3-4 week history of spotting. It is red to pinkish in color. She is having pain with sex, and bleeds afterwards. She also has vaginal bleeding after walking around. She is having right sided vaginal pain along with right sided ovary pain. She has had a similar pain, feeling like something is poking her. Her spouse denies feeling anything. The last time she had this pain, her mesh had came through and she had to have it trimmed.   Vaginal Bleeding  The patient's primary symptoms include pelvic pain and vaginal bleeding. The patient's pertinent negatives include no genital itching, genital lesions, genital odor, genital rash or vaginal discharge. This is a new problem. The current episode started more than 1 month ago. The problem occurs daily. The problem has been waxing and waning. Pain severity now: dull ache to severe. The problem affects the right side. She is not pregnant. Associated symptoms include painful intercourse. Pertinent negatives include no abdominal pain, chills, constipation, diarrhea, dysuria, fever, flank pain, frequency, headaches, hematuria, nausea or urgency. The vaginal bleeding is spotting. She has not been passing clots. She has not been passing tissue. The symptoms are aggravated by intercourse and activity. She is sexually active. No, her partner does not have an STD. She uses hysterectomy for contraception.       The following portions of the patient's history were reviewed and updated as appropriate: allergies, current medications, past family history, past medical history, past social history, past surgical history and problem list.    Review of Systems   Constitutional: Negative for appetite change, chills, fatigue and fever.   Respiratory: Negative for apnea, cough, choking, chest  tightness, shortness of breath, wheezing and stridor.    Cardiovascular: Negative for chest pain, palpitations and leg swelling.   Gastrointestinal: Negative for abdominal pain, constipation, diarrhea and nausea.   Genitourinary: Positive for dyspareunia, pelvic pain, vaginal bleeding and vaginal pain. Negative for breast discharge, breast lump, breast pain, decreased libido, decreased urine volume, difficulty urinating, dysuria, flank pain, frequency, genital sores, hematuria, pelvic pressure, urgency, urinary incontinence and vaginal discharge.       Objective   Physical Exam  Vitals reviewed. Exam conducted with a chaperone present.   Constitutional:       General: She is awake. She is not in acute distress.     Appearance: Normal appearance. She is well-developed and normal weight. She is not ill-appearing, toxic-appearing or diaphoretic.   Cardiovascular:      Rate and Rhythm: Normal rate and regular rhythm.      Pulses: Normal pulses.      Heart sounds: Normal heart sounds.   Pulmonary:      Effort: Pulmonary effort is normal.      Breath sounds: Normal breath sounds.   Abdominal:      General: Bowel sounds are normal.      Hernia: There is no hernia in the left inguinal area or right inguinal area.   Genitourinary:     General: Normal vulva.      Exam position: Lithotomy position.      Pubic Area: No rash or pubic lice.       Kush stage (genital): 5.      Labia:         Right: No rash, tenderness, lesion or injury.         Left: No rash, tenderness, lesion or injury.       Urethra: No prolapse, urethral pain, urethral swelling or urethral lesion.      Vagina: No signs of injury and foreign body. Tenderness present. No vaginal discharge, erythema, bleeding, lesions or prolapsed vaginal walls.      Comments: Right vaginal wall point tenderness at 9:00 2-3cm deep.   Lymphadenopathy:      Lower Body: No right inguinal adenopathy. No left inguinal adenopathy.   Skin:     General: Skin is warm and dry.    Neurological:      Mental Status: She is alert and easily aroused.   Psychiatric:         Behavior: Behavior is cooperative.           Assessment & Plan   Diagnoses and all orders for this visit:    1. Pelvic pain (Primary)  -     Gardnerella vaginalis, Trichomonas vaginalis, Candida albicans, DNA - Swab, Vagina  -     Ambulatory Referral to Physical Therapy Evaluate and treat, Pelvic Floor    2. Vaginal bleeding  -     Gardnerella vaginalis, Trichomonas vaginalis, Candida albicans, DNA - Swab, Vagina    3. Female dyspareunia  -     Gardnerella vaginalis, Trichomonas vaginalis, Candida albicans, DNA - Swab, Vagina  -     Ambulatory Referral to Physical Therapy Evaluate and treat, Pelvic Floor      Will call the patient with vag panel results. If results are negative, will send the patient to pelvic floor PT. If no improvement with PT will send back to uro/gyn. Consider Premarin vaginal cream if still having bleeding following treatment of infection or in no infection is present.        Scribed for MERARI Garay by MERARI Joshi. 11/3/2022  15:49 CDT   I, MERARI Garay, personally performed the services described in this documentation as scribed by the above named individual in my presence, and it is both accurate and complete.  11/3/2022  15:49 CDT

## 2022-11-15 RX ORDER — IBUPROFEN 800 MG/1
800 TABLET ORAL EVERY 8 HOURS PRN
Qty: 90 TABLET | Refills: 0 | Status: SHIPPED | OUTPATIENT
Start: 2022-11-15

## 2023-01-26 DIAGNOSIS — R10.13 EPIGASTRIC PAIN: ICD-10-CM

## 2023-01-26 DIAGNOSIS — R07.9 CHEST PAIN, UNSPECIFIED TYPE: ICD-10-CM

## 2023-01-26 DIAGNOSIS — I10 HYPERTENSION, UNSPECIFIED TYPE: ICD-10-CM

## 2023-01-26 RX ORDER — AMLODIPINE BESYLATE 2.5 MG/1
2.5 TABLET ORAL DAILY
Qty: 90 TABLET | Refills: 0 | Status: SHIPPED | OUTPATIENT
Start: 2023-01-26

## 2023-01-26 RX ORDER — LEVOTHYROXINE SODIUM 0.07 MG/1
TABLET ORAL
Qty: 30 TABLET | Refills: 2 | Status: SHIPPED | OUTPATIENT
Start: 2023-01-26

## 2023-01-26 RX ORDER — ATORVASTATIN CALCIUM 40 MG/1
40 TABLET, FILM COATED ORAL DAILY
Qty: 90 TABLET | Refills: 0 | Status: SHIPPED | OUTPATIENT
Start: 2023-01-26

## 2023-02-08 ENCOUNTER — LAB (OUTPATIENT)
Dept: LAB | Facility: HOSPITAL | Age: 48
End: 2023-02-08
Payer: COMMERCIAL

## 2023-02-08 ENCOUNTER — OFFICE VISIT (OUTPATIENT)
Dept: FAMILY MEDICINE CLINIC | Facility: CLINIC | Age: 48
End: 2023-02-08
Payer: COMMERCIAL

## 2023-02-08 VITALS
SYSTOLIC BLOOD PRESSURE: 120 MMHG | OXYGEN SATURATION: 99 % | HEART RATE: 71 BPM | HEIGHT: 63 IN | WEIGHT: 165 LBS | DIASTOLIC BLOOD PRESSURE: 80 MMHG | BODY MASS INDEX: 29.23 KG/M2

## 2023-02-08 DIAGNOSIS — L91.8 MULTIPLE ACQUIRED SKIN TAGS: ICD-10-CM

## 2023-02-08 DIAGNOSIS — Z12.11 SCREENING FOR COLON CANCER: ICD-10-CM

## 2023-02-08 DIAGNOSIS — I10 PRIMARY HYPERTENSION: ICD-10-CM

## 2023-02-08 DIAGNOSIS — E11.43 TYPE 2 DIABETES MELLITUS WITH DIABETIC AUTONOMIC NEUROPATHY, WITHOUT LONG-TERM CURRENT USE OF INSULIN: Primary | ICD-10-CM

## 2023-02-08 DIAGNOSIS — Z12.31 VISIT FOR SCREENING MAMMOGRAM: ICD-10-CM

## 2023-02-08 PROCEDURE — 82306 VITAMIN D 25 HYDROXY: CPT | Performed by: NURSE PRACTITIONER

## 2023-02-08 PROCEDURE — 83036 HEMOGLOBIN GLYCOSYLATED A1C: CPT | Performed by: NURSE PRACTITIONER

## 2023-02-08 PROCEDURE — 84443 ASSAY THYROID STIM HORMONE: CPT | Performed by: NURSE PRACTITIONER

## 2023-02-08 PROCEDURE — 99214 OFFICE O/P EST MOD 30 MIN: CPT | Performed by: NURSE PRACTITIONER

## 2023-02-08 PROCEDURE — 80061 LIPID PANEL: CPT | Performed by: NURSE PRACTITIONER

## 2023-02-08 PROCEDURE — 36415 COLL VENOUS BLD VENIPUNCTURE: CPT | Performed by: NURSE PRACTITIONER

## 2023-02-08 PROCEDURE — 80053 COMPREHEN METABOLIC PANEL: CPT | Performed by: NURSE PRACTITIONER

## 2023-02-08 PROCEDURE — 82607 VITAMIN B-12: CPT | Performed by: NURSE PRACTITIONER

## 2023-02-08 PROCEDURE — 85025 COMPLETE CBC W/AUTO DIFF WBC: CPT | Performed by: NURSE PRACTITIONER

## 2023-02-08 NOTE — PROGRESS NOTES
Chief Complaint   Patient presents with   • Hypertension     Med refills     Subjective   Karen Estrada is a 47 y.o. female.           Hypertension  This is a chronic problem. The current episode started more than 1 year ago. The problem has been resolved since onset. The problem is controlled. Associated symptoms include malaise/fatigue. Pertinent negatives include no anxiety, blurred vision, neck pain, orthopnea, peripheral edema or PND. Risk factors for coronary artery disease include diabetes mellitus. The current treatment provides significant improvement. Compliance problems include diet.  There is no history of angina, kidney disease, CAD/MI, CVA, heart failure, left ventricular hypertrophy, PVD or retinopathy.   Diabetes  She presents for her follow-up diabetic visit. She has type 2 diabetes mellitus. Hypoglycemia symptoms include nervousness/anxiousness. Pertinent negatives for hypoglycemia include no confusion or dizziness. Associated symptoms include fatigue. Pertinent negatives for diabetes include no blurred vision. Pertinent negatives for diabetic complications include no CVA, PVD or retinopathy. She is compliant with treatment none of the time. Her weight is stable. She is following a generally unhealthy diet. She has not had a previous visit with a dietitian. Her breakfast blood glucose range is generally 110-130 mg/dl. Her lunch blood glucose range is generally 110-130 mg/dl. Her dinner blood glucose range is generally 110-130 mg/dl. Her bedtime blood glucose range is generally 110-130 mg/dl. Her overall blood glucose range is 110-130 mg/dl. She does not see a podiatrist.Eye exam is not current.   Rash  This is a recurrent problem. The current episode started more than 1 month ago. The problem has been gradually worsening since onset. The affected locations include the head and face. Associated symptoms include fatigue. Pertinent negatives include no anorexia, congestion, fever,  "rhinorrhea or sore throat.        The following portions of the patient's history were reviewed and updated as appropriate: allergies, current medications, past social history and problem list.    Review of Systems   Constitutional: Positive for activity change, fatigue and malaise/fatigue. Negative for fever.   HENT: Negative.  Negative for congestion, rhinorrhea and sore throat.    Eyes: Negative for blurred vision.   Cardiovascular: Negative for orthopnea and PND.   Gastrointestinal: Negative.  Negative for anorexia.   Musculoskeletal: Positive for arthralgias. Negative for neck pain.   Skin: Positive for rash.        Multiple skin tags    Allergic/Immunologic: Negative.    Neurological: Negative for dizziness.   Hematological: Negative.  Negative for adenopathy. Does not bruise/bleed easily.   Psychiatric/Behavioral: Negative for confusion. The patient is nervous/anxious.        Objective   /80   Pulse 71   Ht 160 cm (63\")   Wt 74.8 kg (165 lb)   SpO2 99%   BMI 29.23 kg/m²   Physical Exam  Vitals and nursing note reviewed.   Constitutional:       Appearance: Normal appearance.   HENT:      Head: Normocephalic and atraumatic.      Right Ear: Tympanic membrane normal.      Left Ear: Tympanic membrane normal.      Nose: Nose normal.      Mouth/Throat:      Mouth: Mucous membranes are dry.      Pharynx: No oropharyngeal exudate or posterior oropharyngeal erythema.   Eyes:      General: No scleral icterus.        Right eye: No discharge.         Left eye: No discharge.      Extraocular Movements: Extraocular movements intact.      Pupils: Pupils are equal, round, and reactive to light.   Cardiovascular:      Rate and Rhythm: Normal rate and regular rhythm.      Pulses: Normal pulses.      Heart sounds: Normal heart sounds. No murmur heard.    No friction rub. No gallop.   Pulmonary:      Effort: Pulmonary effort is normal.      Breath sounds: Normal breath sounds. No stridor. No wheezing, rhonchi or rales. "   Chest:      Chest wall: No tenderness.   Abdominal:      General: Abdomen is flat. There is no distension.      Palpations: There is no mass.      Tenderness: There is no abdominal tenderness.      Hernia: No hernia is present.   Musculoskeletal:         General: No swelling, tenderness, deformity or signs of injury.      Cervical back: Normal range of motion and neck supple.   Skin:     General: Skin is warm.      Coloration: Skin is not jaundiced or pale.      Findings: Rash present. No bruising, erythema or lesion.      Comments: multiple skin tags around neck   Skin tag right lower back    Neurological:      General: No focal deficit present.      Mental Status: She is alert and oriented to person, place, and time.      Cranial Nerves: No cranial nerve deficit.      Sensory: No sensory deficit.      Motor: No weakness.      Coordination: Coordination normal.      Gait: Gait normal.      Deep Tendon Reflexes: Reflexes normal.   Psychiatric:         Mood and Affect: Mood normal.         Behavior: Behavior normal.              Assessment & Plan     Problems Addressed this Visit        Cardiac and Vasculature    Hypertension   Other Visit Diagnoses     Type 2 diabetes mellitus with diabetic autonomic neuropathy, without long-term current use of insulin (HCC)    -  Primary    Relevant Medications    Semaglutide,0.25 or 0.5MG/DOS, (OZEMPIC) 2 MG/1.5ML solution pen-injector    Other Relevant Orders    CBC & Differential    Comprehensive Metabolic Panel    Lipid Panel    TSH    Vitamin B12    Vitamin D,25-Hydroxy    MicroAlbumin, Urine, Random - Urine, Clean Catch    Multiple acquired skin tags        Relevant Orders    Ambulatory Referral to Dermatology    Screening for colon cancer        Relevant Orders    Cologuard - Stool, Per Rectum    Visit for screening mammogram        Relevant Orders    Mammo Screening Digital Tomosynthesis Bilateral With CAD      Diagnoses       Codes Comments    Type 2 diabetes mellitus  with diabetic autonomic neuropathy, without long-term current use of insulin (HCC)    -  Primary ICD-10-CM: E11.43  ICD-9-CM: 250.60, 337.1     Primary hypertension     ICD-10-CM: I10  ICD-9-CM: 401.9     Multiple acquired skin tags     ICD-10-CM: L91.8  ICD-9-CM: 701.9     Screening for colon cancer     ICD-10-CM: Z12.11  ICD-9-CM: V76.51     Visit for screening mammogram     ICD-10-CM: Z12.31  ICD-9-CM: V76.12            New Medications Ordered This Visit   Medications   • Semaglutide,0.25 or 0.5MG/DOS, (OZEMPIC) 2 MG/1.5ML solution pen-injector     Sig: Inject 0.5 mg under the skin into the appropriate area as directed 1 (One) Time Per Week.     Dispense:  1.5 mL     Refill:  11     Current Outpatient Medications on File Prior to Visit   Medication Sig Dispense Refill   • albuterol sulfate HFA (VENTOLIN HFA) 108 (90 Base) MCG/ACT inhaler Inhale 2 puffs Every 4 (Four) Hours As Needed for Wheezing. 1 inhaler 0   • amLODIPine (NORVASC) 2.5 MG tablet TAKE 1 TABLET BY MOUTH DAILY. 90 tablet 0   • atorvastatin (LIPITOR) 40 MG tablet TAKE 1 TABLET BY MOUTH DAILY 90 tablet 0   • brompheniramine-pseudoephedrine-DM (Bromfed DM) 30-2-10 MG/5ML syrup Take 5 mL by mouth Every 4 (Four) Hours As Needed for Allergies (during the day). 120 mL 0   • busPIRone (BUSPAR) 10 MG tablet Tid prn 90 tablet 11   • escitalopram (LEXAPRO) 10 MG tablet Take 1 tablet by mouth Daily. 30 tablet 11   • glucose blood test strip Use to test BS daily 100 each 12   • glucose monitor monitoring kit Use to test BS daily 1 each 0   • hydroCHLOROthiazide (HYDRODIURIL) 12.5 MG tablet Daily as needed 90 tablet 3   • ibuprofen (ADVIL,MOTRIN) 800 MG tablet Take 1 tablet by mouth Every 8 (Eight) Hours As Needed for Mild Pain or Moderate Pain. for pain 90 tablet 0   • Lancets misc Use to test BS daily 100 each 11   • levothyroxine (SYNTHROID, LEVOTHROID) 75 MCG tablet TAKE ONE TABLET BY MOUTH ONCE DAILY 30 tablet 2   • losartan (COZAAR) 100 MG tablet TAKE ONE  TABLET BY MOUTH ONCE DAILY 90 tablet 2   • meloxicam (MOBIC) 7.5 MG tablet TAKE 1 TO 2 TABLETS BY MOUTH DAILY 60 tablet 5   • metFORMIN (GLUCOPHAGE) 500 MG tablet TAKE ONE TABLET BY MOUTH TWICE A DAY WITH MEALS 60 tablet 2   • ondansetron ODT (Zofran ODT) 4 MG disintegrating tablet Place 1 tablet on the tongue Every 8 (Eight) Hours As Needed for Nausea or Vomiting. 15 tablet 0   • [DISCONTINUED] Semaglutide,0.25 or 0.5MG/DOS, (Ozempic, 0.25 or 0.5 MG/DOSE,) 2 MG/1.5ML solution pen-injector Inject 0.25 mg under the skin into the appropriate area as directed 1 (One) Time Per Week. 2 mL 11     No current facility-administered medications on file prior to visit.       20 minutes    Follow Up   Return in about 6 months (around 8/8/2023).        Labs today, increase ozempic to 0.5mg as directed-see back in 6 months    Needs updated eye exam  Mammogram ordered  cologaurd ordered  Labs as directed  meds as directed        See back in 6 months

## 2023-02-09 ENCOUNTER — TELEPHONE (OUTPATIENT)
Dept: FAMILY MEDICINE CLINIC | Facility: CLINIC | Age: 48
End: 2023-02-09
Payer: COMMERCIAL

## 2023-02-09 LAB
25(OH)D3 SERPL-MCNC: 37.8 NG/ML (ref 30–100)
ALBUMIN SERPL-MCNC: 4.9 G/DL (ref 3.5–5.2)
ALBUMIN/GLOB SERPL: 1.6 G/DL
ALP SERPL-CCNC: 94 U/L (ref 39–117)
ALT SERPL W P-5'-P-CCNC: 27 U/L (ref 1–33)
ANION GAP SERPL CALCULATED.3IONS-SCNC: 10.7 MMOL/L (ref 5–15)
AST SERPL-CCNC: 24 U/L (ref 1–32)
BASOPHILS # BLD AUTO: 0.07 10*3/MM3 (ref 0–0.2)
BASOPHILS NFR BLD AUTO: 0.7 % (ref 0–1.5)
BILIRUB SERPL-MCNC: 0.3 MG/DL (ref 0–1.2)
BUN SERPL-MCNC: 10 MG/DL (ref 6–20)
BUN/CREAT SERPL: 14.9 (ref 7–25)
CALCIUM SPEC-SCNC: 10.2 MG/DL (ref 8.6–10.5)
CHLORIDE SERPL-SCNC: 103 MMOL/L (ref 98–107)
CHOLEST SERPL-MCNC: 139 MG/DL (ref 0–200)
CO2 SERPL-SCNC: 27.3 MMOL/L (ref 22–29)
CREAT SERPL-MCNC: 0.67 MG/DL (ref 0.57–1)
DEPRECATED RDW RBC AUTO: 38.5 FL (ref 37–54)
EGFRCR SERPLBLD CKD-EPI 2021: 107.3 ML/MIN/1.73
EOSINOPHIL # BLD AUTO: 0.18 10*3/MM3 (ref 0–0.4)
EOSINOPHIL NFR BLD AUTO: 1.9 % (ref 0.3–6.2)
ERYTHROCYTE [DISTWIDTH] IN BLOOD BY AUTOMATED COUNT: 13.2 % (ref 12.3–15.4)
GLOBULIN UR ELPH-MCNC: 3 GM/DL
GLUCOSE SERPL-MCNC: 90 MG/DL (ref 65–99)
HBA1C MFR BLD: 5.8 % (ref 4.8–5.6)
HCT VFR BLD AUTO: 38.6 % (ref 34–46.6)
HDLC SERPL-MCNC: 49 MG/DL (ref 40–60)
HGB BLD-MCNC: 12.6 G/DL (ref 12–15.9)
IMM GRANULOCYTES # BLD AUTO: 0.01 10*3/MM3 (ref 0–0.05)
IMM GRANULOCYTES NFR BLD AUTO: 0.1 % (ref 0–0.5)
LDLC SERPL CALC-MCNC: 61 MG/DL (ref 0–100)
LDLC/HDLC SERPL: 1.14 {RATIO}
LYMPHOCYTES # BLD AUTO: 4.27 10*3/MM3 (ref 0.7–3.1)
LYMPHOCYTES NFR BLD AUTO: 44.2 % (ref 19.6–45.3)
MCH RBC QN AUTO: 26.6 PG (ref 26.6–33)
MCHC RBC AUTO-ENTMCNC: 32.6 G/DL (ref 31.5–35.7)
MCV RBC AUTO: 81.4 FL (ref 79–97)
MONOCYTES # BLD AUTO: 0.68 10*3/MM3 (ref 0.1–0.9)
MONOCYTES NFR BLD AUTO: 7 % (ref 5–12)
NEUTROPHILS NFR BLD AUTO: 4.44 10*3/MM3 (ref 1.7–7)
NEUTROPHILS NFR BLD AUTO: 46.1 % (ref 42.7–76)
NRBC BLD AUTO-RTO: 0 /100 WBC (ref 0–0.2)
PLATELET # BLD AUTO: 481 10*3/MM3 (ref 140–450)
PMV BLD AUTO: 10.5 FL (ref 6–12)
POTASSIUM SERPL-SCNC: 3.8 MMOL/L (ref 3.5–5.2)
PROT SERPL-MCNC: 7.9 G/DL (ref 6–8.5)
RBC # BLD AUTO: 4.74 10*6/MM3 (ref 3.77–5.28)
SODIUM SERPL-SCNC: 141 MMOL/L (ref 136–145)
TRIGL SERPL-MCNC: 171 MG/DL (ref 0–150)
TSH SERPL DL<=0.05 MIU/L-ACNC: 1.01 UIU/ML (ref 0.27–4.2)
VIT B12 BLD-MCNC: 734 PG/ML (ref 211–946)
VLDLC SERPL-MCNC: 29 MG/DL (ref 5–40)
WBC NRBC COR # BLD: 9.65 10*3/MM3 (ref 3.4–10.8)

## 2023-02-09 NOTE — TELEPHONE ENCOUNTER
Per MERARI Verma., Ms. Estrada has been called with recent lab results & recommendations.  Continue current medications and follow-up as planned or sooner if any problems.       ----- Message from MERARI Niño sent at 2/9/2023  7:42 AM CST -----  Can you let her know labs are good-a1c is great

## 2023-02-09 NOTE — PROGRESS NOTES
Per MERARI Verma., Ms. Estrada has been called with recent lab results & recommendations.  Continue current medications and follow-up as planned or sooner if any problems.

## 2023-02-24 ENCOUNTER — TELEPHONE (OUTPATIENT)
Dept: FAMILY MEDICINE CLINIC | Facility: CLINIC | Age: 48
End: 2023-02-24
Payer: COMMERCIAL

## 2023-02-24 NOTE — TELEPHONE ENCOUNTER
Per MERARI Verma, Ms. Estrada has been called with recent Cologuard Colorectal Screen results & recommendations.  Continue current medications and follow-up as planned or sooner if any problems.       ----- Message from MERARI Niño sent at 2/24/2023  8:38 AM CST -----  Can you let her know normal

## 2023-04-25 DIAGNOSIS — R07.9 CHEST PAIN, UNSPECIFIED TYPE: ICD-10-CM

## 2023-04-25 DIAGNOSIS — R10.13 EPIGASTRIC PAIN: ICD-10-CM

## 2023-04-25 DIAGNOSIS — I10 HYPERTENSION, UNSPECIFIED TYPE: ICD-10-CM

## 2023-04-25 RX ORDER — ESCITALOPRAM OXALATE 10 MG/1
10 TABLET ORAL DAILY
Qty: 30 TABLET | Refills: 11 | Status: SHIPPED | OUTPATIENT
Start: 2023-04-25

## 2023-04-25 RX ORDER — LEVOTHYROXINE SODIUM 0.07 MG/1
75 TABLET ORAL DAILY
Qty: 30 TABLET | Refills: 2 | Status: SHIPPED | OUTPATIENT
Start: 2023-04-25

## 2023-04-25 RX ORDER — IBUPROFEN 800 MG/1
800 TABLET ORAL EVERY 8 HOURS PRN
Qty: 90 TABLET | Refills: 0 | Status: SHIPPED | OUTPATIENT
Start: 2023-04-25

## 2023-04-25 RX ORDER — LOSARTAN POTASSIUM 100 MG/1
100 TABLET ORAL DAILY
Qty: 90 TABLET | Refills: 2 | Status: SHIPPED | OUTPATIENT
Start: 2023-04-25

## 2023-05-08 RX ORDER — AMLODIPINE BESYLATE 2.5 MG/1
TABLET ORAL
Qty: 90 TABLET | Refills: 0 | Status: SHIPPED | OUTPATIENT
Start: 2023-05-08

## 2023-07-24 DIAGNOSIS — E07.89 THYROID FULLNESS: Primary | ICD-10-CM

## 2023-07-31 ENCOUNTER — HOSPITAL ENCOUNTER (OUTPATIENT)
Dept: ULTRASOUND IMAGING | Facility: HOSPITAL | Age: 48
Discharge: HOME | End: 2023-07-31
Admitting: NURSE PRACTITIONER
Payer: COMMERCIAL

## 2023-07-31 PROCEDURE — 76536 US EXAM OF HEAD AND NECK: CPT

## 2023-08-01 ENCOUNTER — TELEPHONE (OUTPATIENT)
Dept: FAMILY MEDICINE CLINIC | Facility: CLINIC | Age: 48
End: 2023-08-01
Payer: COMMERCIAL

## 2023-08-04 DIAGNOSIS — I10 HYPERTENSION, UNSPECIFIED TYPE: ICD-10-CM

## 2023-08-04 DIAGNOSIS — R07.9 CHEST PAIN, UNSPECIFIED TYPE: ICD-10-CM

## 2023-08-04 DIAGNOSIS — R10.13 EPIGASTRIC PAIN: ICD-10-CM

## 2023-08-21 PROCEDURE — 87081 CULTURE SCREEN ONLY: CPT | Performed by: NURSE PRACTITIONER

## 2023-08-21 PROCEDURE — 87147 CULTURE TYPE IMMUNOLOGIC: CPT | Performed by: NURSE PRACTITIONER

## 2023-09-01 RX ORDER — AMLODIPINE BESYLATE 2.5 MG/1
TABLET ORAL
Qty: 90 TABLET | Refills: 0 | Status: SHIPPED | OUTPATIENT
Start: 2023-09-01

## 2023-09-08 RX ORDER — AMLODIPINE BESYLATE 2.5 MG/1
2.5 TABLET ORAL DAILY
Qty: 90 TABLET | Refills: 0 | Status: SHIPPED | OUTPATIENT
Start: 2023-09-08

## 2023-09-08 NOTE — TELEPHONE ENCOUNTER
Refill sent 09/01/2023, should be on file at the pharmacy    LAST OFFICE VISIT - THIS DEPT  7/13/2023 Luci Correa APRN

## 2023-09-11 ENCOUNTER — HOSPITAL ENCOUNTER (EMERGENCY)
Facility: HOSPITAL | Age: 48
Discharge: HOME | End: 2023-09-12
Attending: EMERGENCY MEDICINE | Admitting: STUDENT IN AN ORGANIZED HEALTH CARE EDUCATION/TRAINING PROGRAM
Payer: COMMERCIAL

## 2023-09-11 DIAGNOSIS — R11.2 NAUSEA AND VOMITING, UNSPECIFIED VOMITING TYPE: Primary | ICD-10-CM

## 2023-09-11 DIAGNOSIS — R19.7 DIARRHEA, UNSPECIFIED TYPE: ICD-10-CM

## 2023-09-11 DIAGNOSIS — R10.9 ABDOMINAL PAIN, UNSPECIFIED ABDOMINAL LOCATION: ICD-10-CM

## 2023-09-11 LAB
ALBUMIN SERPL-MCNC: 4.9 G/DL (ref 3.5–5.2)
ALBUMIN/GLOB SERPL: 1.4 G/DL
ALP SERPL-CCNC: 96 U/L (ref 39–117)
ALT SERPL W P-5'-P-CCNC: 22 U/L (ref 1–33)
ANION GAP SERPL CALCULATED.3IONS-SCNC: 15 MMOL/L (ref 5–15)
AST SERPL-CCNC: 21 U/L (ref 1–32)
BACTERIA UR QL AUTO: ABNORMAL /HPF
BASOPHILS # BLD AUTO: 0.06 10*3/MM3 (ref 0–0.2)
BASOPHILS NFR BLD AUTO: 0.4 % (ref 0–1.5)
BILIRUB SERPL-MCNC: 0.4 MG/DL (ref 0–1.2)
BILIRUB UR QL STRIP: NEGATIVE
BUN SERPL-MCNC: 11 MG/DL (ref 6–20)
BUN/CREAT SERPL: 15.1 (ref 7–25)
CALCIUM SPEC-SCNC: 9.8 MG/DL (ref 8.6–10.5)
CHLORIDE SERPL-SCNC: 104 MMOL/L (ref 98–107)
CLARITY UR: ABNORMAL
CO2 SERPL-SCNC: 23 MMOL/L (ref 22–29)
COD CRY URNS QL: ABNORMAL /HPF
COLOR UR: ABNORMAL
CREAT SERPL-MCNC: 0.73 MG/DL (ref 0.57–1)
D-LACTATE SERPL-SCNC: 0.7 MMOL/L (ref 0.5–2)
DEPRECATED RDW RBC AUTO: 40.4 FL (ref 37–54)
EGFRCR SERPLBLD CKD-EPI 2021: 100.3 ML/MIN/1.73
EOSINOPHIL # BLD AUTO: 0.45 10*3/MM3 (ref 0–0.4)
EOSINOPHIL NFR BLD AUTO: 3.1 % (ref 0.3–6.2)
ERYTHROCYTE [DISTWIDTH] IN BLOOD BY AUTOMATED COUNT: 13.2 % (ref 12.3–15.4)
GLOBULIN UR ELPH-MCNC: 3.6 GM/DL
GLUCOSE SERPL-MCNC: 105 MG/DL (ref 65–99)
GLUCOSE UR STRIP-MCNC: NEGATIVE MG/DL
GRAN CASTS URNS QL MICRO: ABNORMAL /LPF
HCT VFR BLD AUTO: 39.6 % (ref 34–46.6)
HGB BLD-MCNC: 13 G/DL (ref 12–15.9)
HGB UR QL STRIP.AUTO: NEGATIVE
HOLD SPECIMEN: NORMAL
HOLD SPECIMEN: NORMAL
HYALINE CASTS UR QL AUTO: ABNORMAL /LPF
IMM GRANULOCYTES # BLD AUTO: 0.04 10*3/MM3 (ref 0–0.05)
IMM GRANULOCYTES NFR BLD AUTO: 0.3 % (ref 0–0.5)
KETONES UR QL STRIP: ABNORMAL
LEUKOCYTE ESTERASE UR QL STRIP.AUTO: ABNORMAL
LIPASE SERPL-CCNC: 35 U/L (ref 13–60)
LYMPHOCYTES # BLD AUTO: 2.1 10*3/MM3 (ref 0.7–3.1)
LYMPHOCYTES NFR BLD AUTO: 14.3 % (ref 19.6–45.3)
MCH RBC QN AUTO: 27.4 PG (ref 26.6–33)
MCHC RBC AUTO-ENTMCNC: 32.8 G/DL (ref 31.5–35.7)
MCV RBC AUTO: 83.5 FL (ref 79–97)
MONOCYTES # BLD AUTO: 0.94 10*3/MM3 (ref 0.1–0.9)
MONOCYTES NFR BLD AUTO: 6.4 % (ref 5–12)
MUCOUS THREADS URNS QL MICRO: ABNORMAL /HPF
NEUTROPHILS NFR BLD AUTO: 11.05 10*3/MM3 (ref 1.7–7)
NEUTROPHILS NFR BLD AUTO: 75.5 % (ref 42.7–76)
NITRITE UR QL STRIP: NEGATIVE
NRBC BLD AUTO-RTO: 0 /100 WBC (ref 0–0.2)
PH UR STRIP.AUTO: <=5 [PH] (ref 5–9)
PLATELET # BLD AUTO: 376 10*3/MM3 (ref 140–450)
PMV BLD AUTO: 10.2 FL (ref 6–12)
POTASSIUM SERPL-SCNC: 3.4 MMOL/L (ref 3.5–5.2)
PROT SERPL-MCNC: 8.5 G/DL (ref 6–8.5)
PROT UR QL STRIP: ABNORMAL
RBC # BLD AUTO: 4.74 10*6/MM3 (ref 3.77–5.28)
RBC # UR STRIP: ABNORMAL /HPF
REF LAB TEST METHOD: ABNORMAL
SODIUM SERPL-SCNC: 142 MMOL/L (ref 136–145)
SP GR UR STRIP: 1.03 (ref 1–1.03)
SQUAMOUS #/AREA URNS HPF: ABNORMAL /HPF
UROBILINOGEN UR QL STRIP: ABNORMAL
WBC # UR STRIP: ABNORMAL /HPF
WBC NRBC COR # BLD: 14.64 10*3/MM3 (ref 3.4–10.8)
WHOLE BLOOD HOLD COAG: NORMAL
WHOLE BLOOD HOLD SPECIMEN: NORMAL

## 2023-09-11 PROCEDURE — 85025 COMPLETE CBC W/AUTO DIFF WBC: CPT | Performed by: EMERGENCY MEDICINE

## 2023-09-11 PROCEDURE — 83690 ASSAY OF LIPASE: CPT | Performed by: STUDENT IN AN ORGANIZED HEALTH CARE EDUCATION/TRAINING PROGRAM

## 2023-09-11 PROCEDURE — 99283 EMERGENCY DEPT VISIT LOW MDM: CPT

## 2023-09-11 PROCEDURE — 80053 COMPREHEN METABOLIC PANEL: CPT | Performed by: EMERGENCY MEDICINE

## 2023-09-11 PROCEDURE — 96374 THER/PROPH/DIAG INJ IV PUSH: CPT

## 2023-09-11 PROCEDURE — 81001 URINALYSIS AUTO W/SCOPE: CPT | Performed by: STUDENT IN AN ORGANIZED HEALTH CARE EDUCATION/TRAINING PROGRAM

## 2023-09-11 PROCEDURE — 25010000002 ONDANSETRON PER 1 MG: Performed by: STUDENT IN AN ORGANIZED HEALTH CARE EDUCATION/TRAINING PROGRAM

## 2023-09-11 PROCEDURE — 83605 ASSAY OF LACTIC ACID: CPT | Performed by: STUDENT IN AN ORGANIZED HEALTH CARE EDUCATION/TRAINING PROGRAM

## 2023-09-11 RX ORDER — ONDANSETRON 4 MG/1
4 TABLET, ORALLY DISINTEGRATING ORAL 4 TIMES DAILY PRN
Qty: 12 TABLET | Refills: 0 | Status: SHIPPED | OUTPATIENT
Start: 2023-09-11

## 2023-09-11 RX ORDER — ONDANSETRON 2 MG/ML
4 INJECTION INTRAMUSCULAR; INTRAVENOUS ONCE
Status: COMPLETED | OUTPATIENT
Start: 2023-09-11 | End: 2023-09-11

## 2023-09-11 RX ORDER — SODIUM CHLORIDE 0.9 % (FLUSH) 0.9 %
10 SYRINGE (ML) INJECTION AS NEEDED
Status: DISCONTINUED | OUTPATIENT
Start: 2023-09-11 | End: 2023-09-12 | Stop reason: HOSPADM

## 2023-09-11 RX ADMIN — ONDANSETRON 4 MG: 2 INJECTION INTRAMUSCULAR; INTRAVENOUS at 23:11

## 2023-09-11 RX ADMIN — SODIUM CHLORIDE 1000 ML: 9 INJECTION, SOLUTION INTRAVENOUS at 23:10

## 2023-09-11 NOTE — Clinical Note
[facility] EMERGENCY DEPARTMENT  [address] [place] [id number]  Phone: [phone]    Karen Estrada was seen and treated in our emergency department on 9/11/2023.  She may return to work on 09/13/2023.         Thank you for choosing [facility].    Deena Caruso RN

## 2023-09-11 NOTE — Clinical Note
[facility] EMERGENCY DEPARTMENT  [address] [place] [id number]  Phone: [phone]    Karen Estrada was seen and treated in our emergency department on 9/11/2023.  She may return to work on 09/13/2023.         Thank you for choosing [facility].    Deena Caruso RN

## 2023-09-11 NOTE — Clinical Note
[facility] EMERGENCY DEPARTMENT  [address] [place] [id number]  Phone: [phone]    nazario estrada accompanied Karen Estrada to the emergency department on 9/11/2023. They may return to work on 09/13/2023.        Thank you for choosing [facility].    Deena Caruso RN

## 2023-09-12 VITALS
SYSTOLIC BLOOD PRESSURE: 128 MMHG | TEMPERATURE: 98.4 F | OXYGEN SATURATION: 97 % | DIASTOLIC BLOOD PRESSURE: 64 MMHG | HEIGHT: 63 IN | BODY MASS INDEX: 25.69 KG/M2 | HEART RATE: 58 BPM | WEIGHT: 145 LBS | RESPIRATION RATE: 18 BRPM

## 2023-09-12 NOTE — ED PROVIDER NOTES
Subjective   History of Present Illness  48-year-old female comes to the ER with a couple day history of nausea, vomiting, diarrhea and abdominal discomfort.  She has not had very much p.o. intake for the last few days.  Today she is feeling weak and tired with not a lot of energy.  She denies other symptoms.    History provided by:  Patient   used: No      Review of Systems   Constitutional:  Positive for activity change, appetite change and fatigue. Negative for chills and fever.   HENT:  Negative for drooling.    Eyes:  Negative for redness.   Respiratory:  Negative for shortness of breath.    Cardiovascular:  Negative for chest pain.   Gastrointestinal:  Positive for abdominal pain, diarrhea, nausea and vomiting. Negative for constipation.   Genitourinary:  Negative for flank pain.   Skin:  Negative for color change.   Neurological:  Negative for seizures.   Psychiatric/Behavioral:  Negative for confusion.      Past Medical History:   Diagnosis Date    Anxiety     Backache     radiation to legs-abnormal MRI    Benign hypertension     Breast cyst     Breast lump     left breast    Chest pain     Cough     Degenerative joint disease involving multiple joints     Depression     Epigastric pain     Essential hypertension     Fatigue     Fatty infiltration of liver 7/7/2021    Functional heart murmur     Gestational diabetes mellitus     History of transfusion     Hypertensive disorder     Intrinsic asthma with status asthmaticus     Low back pain     Lumbar disc lesion     Lymphadenopathy     Malaise and fatigue     Migraine     Mixed hyperlipidemia 7/7/2021    Neck pain     throat pain    Non-IgE mediated allergic asthma     Noncompliance with treatment     Overweight     Plantar fascial fibromatosis     Plantar fasciitis of right foot     Soft tissue swelling     right lateral chest wall    Surgery follow-up     Upper respiratory infection     Wheezing        Allergies   Allergen Reactions     "Promethazine Itching    Adhesive Tape Swelling and Rash       Past Surgical History:   Procedure Laterality Date    BREAST BIOPSY  1998    left mass, fibrosis, mild     SECTION      COLONOSCOPY  2005    normal colonoscopic exam.  Irritable bowel    DILATATION AND CURETTAGE  1998    incomplete     FOOT SURGERY  2015    partial plantar fasciectomy, right foot    HYSTERECTOMY  2007    menorrhagia; still has both ovaries    INJECTION OF MEDICATION  2014    dexamethasone (1)    INJECTION OF MEDICATION  2014    inj Tend-Sheath Ligament  (3)    INJECTION OF MEDICATION  2014    Kenalog (4)    INJECTION OF MEDICATION  2011    Toradol (1)    PAP SMEAR  10/15/1996    normal    TUBAL ABDOMINAL LIGATION      VAGINAL DELIVERY      x3       Family History   Problem Relation Age of Onset    Breast cancer Sister        Social History     Socioeconomic History    Marital status:    Tobacco Use    Smoking status: Never    Smokeless tobacco: Never   Vaping Use    Vaping Use: Never used   Substance and Sexual Activity    Alcohol use: No    Drug use: No    Sexual activity: Yes     Partners: Male     Birth control/protection: Surgical           Objective   Vitals:    23 2041 23 2208 23 2300   BP: 109/68 133/82 114/65   BP Location:  Right arm    Patient Position:  Sitting    Pulse: 87 61 60   Resp: 18 18 18   Temp: 98.4 °F (36.9 °C)     TempSrc: Oral     SpO2: 98% 98% 96%   Weight: 65.8 kg (145 lb)     Height: 160 cm (63\")         Physical Exam  Vitals and nursing note reviewed.   Constitutional:       General: She is not in acute distress.     Appearance: She is well-developed. She is ill-appearing. She is not toxic-appearing or diaphoretic.   Pulmonary:      Effort: Pulmonary effort is normal. No accessory muscle usage or respiratory distress.   Chest:      Chest wall: No tenderness.   Abdominal:      Palpations: Abdomen is soft.      " Tenderness: There is abdominal tenderness (deep palpation). There is no right CVA tenderness, left CVA tenderness, guarding or rebound.   Skin:     General: Skin is warm and dry.      Capillary Refill: Capillary refill takes less than 2 seconds.   Neurological:      Mental Status: She is alert and oriented to person, place, and time.       Procedures           ED Course      Results for orders placed or performed during the hospital encounter of 09/11/23   Comprehensive Metabolic Panel    Specimen: Blood   Result Value Ref Range    Glucose 105 (H) 65 - 99 mg/dL    BUN 11 6 - 20 mg/dL    Creatinine 0.73 0.57 - 1.00 mg/dL    Sodium 142 136 - 145 mmol/L    Potassium 3.4 (L) 3.5 - 5.2 mmol/L    Chloride 104 98 - 107 mmol/L    CO2 23.0 22.0 - 29.0 mmol/L    Calcium 9.8 8.6 - 10.5 mg/dL    Total Protein 8.5 6.0 - 8.5 g/dL    Albumin 4.9 3.5 - 5.2 g/dL    ALT (SGPT) 22 1 - 33 U/L    AST (SGOT) 21 1 - 32 U/L    Alkaline Phosphatase 96 39 - 117 U/L    Total Bilirubin 0.4 0.0 - 1.2 mg/dL    Globulin 3.6 gm/dL    A/G Ratio 1.4 g/dL    BUN/Creatinine Ratio 15.1 7.0 - 25.0    Anion Gap 15.0 5.0 - 15.0 mmol/L    eGFR 100.3 >60.0 mL/min/1.73   CBC Auto Differential    Specimen: Blood   Result Value Ref Range    WBC 14.64 (H) 3.40 - 10.80 10*3/mm3    RBC 4.74 3.77 - 5.28 10*6/mm3    Hemoglobin 13.0 12.0 - 15.9 g/dL    Hematocrit 39.6 34.0 - 46.6 %    MCV 83.5 79.0 - 97.0 fL    MCH 27.4 26.6 - 33.0 pg    MCHC 32.8 31.5 - 35.7 g/dL    RDW 13.2 12.3 - 15.4 %    RDW-SD 40.4 37.0 - 54.0 fl    MPV 10.2 6.0 - 12.0 fL    Platelets 376 140 - 450 10*3/mm3    Neutrophil % 75.5 42.7 - 76.0 %    Lymphocyte % 14.3 (L) 19.6 - 45.3 %    Monocyte % 6.4 5.0 - 12.0 %    Eosinophil % 3.1 0.3 - 6.2 %    Basophil % 0.4 0.0 - 1.5 %    Immature Grans % 0.3 0.0 - 0.5 %    Neutrophils, Absolute 11.05 (H) 1.70 - 7.00 10*3/mm3    Lymphocytes, Absolute 2.10 0.70 - 3.10 10*3/mm3    Monocytes, Absolute 0.94 (H) 0.10 - 0.90 10*3/mm3    Eosinophils, Absolute 0.45  (H) 0.00 - 0.40 10*3/mm3    Basophils, Absolute 0.06 0.00 - 0.20 10*3/mm3    Immature Grans, Absolute 0.04 0.00 - 0.05 10*3/mm3    nRBC 0.0 0.0 - 0.2 /100 WBC   Lipase    Specimen: Blood   Result Value Ref Range    Lipase 35 13 - 60 U/L   Urinalysis With Microscopic If Indicated (No Culture) - Urine, Clean Catch    Specimen: Urine, Clean Catch   Result Value Ref Range    Color, UA Dark Yellow Yellow, Straw, Dark Yellow, Korin    Appearance, UA Turbid (A) Clear    pH, UA <=5.0 5.0 - 9.0    Specific Gravity, UA 1.027 1.003 - 1.030    Glucose, UA Negative Negative    Ketones, UA Trace (A) Negative    Bilirubin, UA Negative Negative    Blood, UA Negative Negative    Protein, UA Trace (A) Negative    Leuk Esterase, UA Small (1+) (A) Negative    Nitrite, UA Negative Negative    Urobilinogen, UA 1.0 E.U./dL 0.2 - 1.0 E.U./dL   Lactic Acid, Plasma    Specimen: Blood   Result Value Ref Range    Lactate 0.7 0.5 - 2.0 mmol/L   Urinalysis, Microscopic Only - Urine, Clean Catch    Specimen: Urine, Clean Catch   Result Value Ref Range    RBC, UA 3-5 (A) None Seen /HPF    WBC, UA 3-5 None Seen, 0-2, 3-5 /HPF    Bacteria, UA Trace (A) None Seen /HPF    Squamous Epithelial Cells, UA 3-5 (A) None Seen, 0-2 /HPF    Hyaline Casts, UA 7-12 None Seen /LPF    Granular Casts, UA 0-2 None Seen /LPF    Calcium Oxalate Crystals, UA Moderate/2+ None Seen /HPF    Mucus, UA Large/3+ (A) None Seen, Trace /HPF    Methodology Manual Light Microscopy    Green Top (Gel)   Result Value Ref Range    Extra Tube Hold for add-ons.    Lavender Top   Result Value Ref Range    Extra Tube hold for add-on    Gold Top - SST   Result Value Ref Range    Extra Tube Hold for add-ons.    Light Blue Top   Result Value Ref Range    Extra Tube Hold for add-ons.                             Medical Decision Making  Vital signs are stable, afebrile.  Labs are unremarkable.  White count is 14 with no shift.  UA shows 1+ leukocytes, nitrate negative.  Lactic acid is normal.   IVF bolus and Zofran given.  Will call Zofran into her pharmacy.  Recommend follow-up with her PCP.  Return precautions given.  Symptomatic care discussed.  Patient states understanding and is agreeable to the plan.    Problems Addressed:  Abdominal pain, unspecified abdominal location: complicated acute illness or injury  Diarrhea, unspecified type: complicated acute illness or injury  Nausea and vomiting, unspecified vomiting type: complicated acute illness or injury    Amount and/or Complexity of Data Reviewed  Independent Historian: spouse  Labs: ordered.    Risk  Prescription drug management.        Final diagnoses:   Nausea and vomiting, unspecified vomiting type   Abdominal pain, unspecified abdominal location   Diarrhea, unspecified type       ED Disposition  ED Disposition       ED Disposition   Discharge    Condition   Stable    Comment   --               Luci Correa, APRN  [address]  [address] [facility] [address]  [place] [address]  [phone]    Schedule an appointment as soon as possible for a visit in 2 days  As needed, ER follow up         Medication List        Changed      * ondansetron ODT 4 MG disintegrating tablet  Commonly known as: Zofran ODT  Place 1 tablet on the tongue Every 8 (Eight) Hours As Needed for Nausea or Vomiting.  What changed: Another medication with the same name was added. Make sure you understand how and when to take each.     * ondansetron ODT 4 MG disintegrating tablet  Commonly known as: ZOFRAN-ODT  Place 1 tablet on the tongue 4 (Four) Times a Day As Needed for Nausea or Vomiting.  What changed: You were already taking a medication with the same name, and this prescription was added. Make sure you understand how and when to take each.           * This list has 2 medication(s) that are the same as other medications prescribed for you. Read the directions carefully, and ask your doctor or other care provider to review them with you.                   Where to Get Your  Medications        These medications were sent to [facility] - [place], [place] - [address] [facility] - [phone]  - [phone] FX  [address] [facility] [address]      Phone: [phone]   ondansetron ODT 4 MG disintegrating tablet            Ad Arambula MD  09/11/23 4056